# Patient Record
Sex: FEMALE | Race: OTHER | NOT HISPANIC OR LATINO | Employment: UNEMPLOYED | URBAN - METROPOLITAN AREA
[De-identification: names, ages, dates, MRNs, and addresses within clinical notes are randomized per-mention and may not be internally consistent; named-entity substitution may affect disease eponyms.]

---

## 2018-05-03 ENCOUNTER — OFFICE VISIT (OUTPATIENT)
Dept: FAMILY MEDICINE | Facility: CLINIC | Age: 68
End: 2018-05-03
Attending: FAMILY MEDICINE
Payer: COMMERCIAL

## 2018-05-03 ENCOUNTER — TELEPHONE (OUTPATIENT)
Dept: FAMILY MEDICINE | Facility: CLINIC | Age: 68
End: 2018-05-03

## 2018-05-03 VITALS
BODY MASS INDEX: 29.88 KG/M2 | OXYGEN SATURATION: 96 % | HEIGHT: 64 IN | TEMPERATURE: 99 F | DIASTOLIC BLOOD PRESSURE: 68 MMHG | WEIGHT: 175 LBS | SYSTOLIC BLOOD PRESSURE: 120 MMHG | HEART RATE: 73 BPM

## 2018-05-03 DIAGNOSIS — E78.2 MIXED HYPERLIPIDEMIA: ICD-10-CM

## 2018-05-03 DIAGNOSIS — M25.461 EFFUSION OF BOTH KNEE JOINTS: ICD-10-CM

## 2018-05-03 DIAGNOSIS — I10 ESSENTIAL HYPERTENSION: ICD-10-CM

## 2018-05-03 DIAGNOSIS — M25.562 BILATERAL CHRONIC KNEE PAIN: ICD-10-CM

## 2018-05-03 DIAGNOSIS — M17.0 PRIMARY OSTEOARTHRITIS OF BOTH KNEES: Primary | ICD-10-CM

## 2018-05-03 DIAGNOSIS — M25.561 BILATERAL CHRONIC KNEE PAIN: ICD-10-CM

## 2018-05-03 DIAGNOSIS — M25.462 EFFUSION OF BOTH KNEE JOINTS: ICD-10-CM

## 2018-05-03 DIAGNOSIS — G89.29 BILATERAL CHRONIC KNEE PAIN: ICD-10-CM

## 2018-05-03 PROCEDURE — 99999 PR PBB SHADOW E&M-NEW PATIENT-LVL IV: CPT | Mod: PBBFAC,,, | Performed by: FAMILY MEDICINE

## 2018-05-03 PROCEDURE — 3078F DIAST BP <80 MM HG: CPT | Mod: CPTII,S$GLB,, | Performed by: FAMILY MEDICINE

## 2018-05-03 PROCEDURE — 99204 OFFICE O/P NEW MOD 45 MIN: CPT | Mod: S$GLB,,, | Performed by: FAMILY MEDICINE

## 2018-05-03 PROCEDURE — 3074F SYST BP LT 130 MM HG: CPT | Mod: CPTII,S$GLB,, | Performed by: FAMILY MEDICINE

## 2018-05-03 RX ORDER — HYDROCODONE BITARTRATE AND ACETAMINOPHEN 10; 325 MG/1; MG/1
1 TABLET ORAL EVERY 12 HOURS PRN
Qty: 30 TABLET | Refills: 0 | Status: SHIPPED | OUTPATIENT
Start: 2018-05-03 | End: 2018-05-29 | Stop reason: SDUPTHER

## 2018-05-03 RX ORDER — SIMVASTATIN 20 MG/1
20 TABLET, FILM COATED ORAL NIGHTLY
COMMUNITY
End: 2018-07-10 | Stop reason: SDUPTHER

## 2018-05-03 RX ORDER — MULTIVIT WITH MINERALS/HERBS
1 TABLET ORAL DAILY
COMMUNITY

## 2018-05-03 RX ORDER — CARVEDILOL 3.12 MG/1
3.12 TABLET ORAL 2 TIMES DAILY
COMMUNITY
End: 2018-07-10 | Stop reason: SDUPTHER

## 2018-05-03 RX ORDER — ASPIRIN 81 MG/1
81 TABLET ORAL DAILY
COMMUNITY

## 2018-05-03 RX ORDER — LOSARTAN POTASSIUM 100 MG/1
100 TABLET ORAL DAILY
COMMUNITY
End: 2018-09-24 | Stop reason: SDUPTHER

## 2018-05-03 NOTE — TELEPHONE ENCOUNTER
----- Message from Millicent Gillette sent at 5/3/2018  2:35 PM CDT -----  Contact: Sujit [phone]  Sujit  ( son ) [phone] , his mother has an after hour appointment with Dr. Coleman. He said he received a call from the office and needs to know what it is in regards to. Please advise

## 2018-05-03 NOTE — PROGRESS NOTES
Subjective:       Patient ID: Rigoberto Hughes is a 67 y.o. female.    Chief Complaint: Knee Pain (anali with swelling x 2 yrs)    67 yr old pleasant [place] female with hypertension, hyperlipidemia, osteoarthritis knee, obesity and no other significant medical history presents today as new patient and establishing primary care and evaluation of severe bilateral knee pain and swelling. Onset 6 months ago but got worse since in these last 2 months. No injury or trauma. She recently moved from [place] 3 weeks ago. She had PCP over there but did not see any Orthopedics yet. The pain is constant and worse when stands on her leg and walking and movements. She was given diclofenac gel and norco which helped some but the pain comes right back after few hours. Details as follows       HTN - controlled - on coreg and losartan - compliant - no side effects       HLD - lab due - on statin - no side effect      History as below - reviewed       Knee Pain    There was no injury mechanism. The pain is present in the right knee and left knee. The quality of the pain is described as aching. The pain is at a severity of 10/10. The pain is severe. The pain has been constant since onset. Pertinent negatives include no numbness. The symptoms are aggravated by movement, palpation and weight bearing. She has tried heat, ice, elevation, immobilization, NSAIDs and acetaminophen for the symptoms. The treatment provided no relief.   Edema   This is a new problem. The current episode started 1 to 4 weeks ago. The problem occurs constantly. The problem has been unchanged. Associated symptoms include arthralgias, joint swelling and myalgias. Pertinent negatives include no abdominal pain, change in bowel habit, chest pain, chills, congestion, diaphoresis, fever, headaches, nausea, numbness, rash, sore throat, swollen glands, visual change or weakness. The symptoms are aggravated by walking and twisting. She has tried NSAIDs, sleep, rest, oral  narcotics and heat for the symptoms. The treatment provided mild relief.     Review of Systems   Constitutional: Negative.  Negative for activity change, chills, diaphoresis, fever and unexpected weight change.   HENT: Negative.  Negative for congestion, ear discharge, hearing loss, rhinorrhea, sore throat and voice change.    Eyes: Negative.  Negative for pain, discharge and visual disturbance.   Respiratory: Negative.  Negative for chest tightness, shortness of breath and wheezing.    Cardiovascular: Negative.  Negative for chest pain.   Gastrointestinal: Negative.  Negative for abdominal distention, abdominal pain, anal bleeding, change in bowel habit, constipation and nausea.   Endocrine: Negative.  Negative for cold intolerance, polydipsia and polyuria.   Genitourinary: Negative.  Negative for decreased urine volume, difficulty urinating, dysuria, frequency, menstrual problem and vaginal pain.   Musculoskeletal: Positive for arthralgias, joint swelling and myalgias. Negative for gait problem.        Knee pain and swelling B/L   Skin: Negative.  Negative for color change, pallor, rash and wound.   Allergic/Immunologic: Negative.  Negative for environmental allergies and immunocompromised state.   Neurological: Negative.  Negative for dizziness, tremors, seizures, speech difficulty, weakness, numbness and headaches.   Hematological: Negative.  Negative for adenopathy. Does not bruise/bleed easily.   Psychiatric/Behavioral: Negative.  Negative for agitation, confusion, decreased concentration, hallucinations, self-injury and suicidal ideas. The patient is not nervous/anxious.          Active Ambulatory Problems     Diagnosis Date Noted    Mixed hyperlipidemia 05/03/2018    Essential hypertension 05/03/2018    BMI 30.0-30.9,adult 05/03/2018    Effusion of both knee joints 05/03/2018     Resolved Ambulatory Problems     Diagnosis Date Noted    No Resolved Ambulatory Problems     Past Medical History:    Diagnosis Date    Arthritis     Hyperlipidemia     Hypertension      Past Surgical History:   Procedure Laterality Date     SECTION      HERNIA REPAIR       Family History   Problem Relation Age of Onset    Heart disease Mother     Diabetes Father     Heart disease Father      Social History     Social History    Marital status: Single     Spouse name: N/A    Number of children: N/A    Years of education: N/A     Occupational History    Not on file.     Social History Main Topics    Smoking status: Never Smoker    Smokeless tobacco: Never Used    Alcohol use No    Drug use: No    Sexual activity: No     Other Topics Concern    Not on file     Social History Narrative    No narrative on file     Review of patient's allergies indicates:  No Known Allergies  No current outpatient prescriptions on file prior to visit.     No current facility-administered medications on file prior to visit.        Objective:       Vitals:    18 1650   BP: 120/68   Pulse: 73   Temp: 99 °F (37.2 °C)       Physical Exam   Constitutional: She is oriented to person, place, and time. She appears well-developed and well-nourished.   HENT:   Head: Normocephalic and atraumatic.   Neck: Normal range of motion. Neck supple.   Cardiovascular: Normal rate, regular rhythm, normal heart sounds and intact distal pulses.  Exam reveals no gallop and no friction rub.    No murmur heard.  Pulmonary/Chest: Effort normal and breath sounds normal. No respiratory distress. She has no wheezes. She has no rales. She exhibits no tenderness.   Musculoskeletal: She exhibits edema and tenderness.        Right knee: She exhibits decreased range of motion, swelling, effusion and bony tenderness. Tenderness found. Medial joint line and lateral joint line tenderness noted.        Left knee: She exhibits decreased range of motion, swelling, effusion, erythema and bony tenderness. Tenderness found. Medial joint line and lateral joint line  tenderness noted.        Legs:  Neurological: She is alert and oriented to person, place, and time.   Skin: Skin is warm and dry. Capillary refill takes less than 2 seconds.   Psychiatric: She has a normal mood and affect.       Assessment:       1. Primary osteoarthritis of both knees    2. Bilateral chronic knee pain    3. Mixed hyperlipidemia    4. Essential hypertension    5. BMI 30.0-30.9,adult    6. Effusion of both knee joints        Plan:         Rigoberto was seen today for knee pain.    Diagnoses and all orders for this visit:    Primary osteoarthritis of both knees  -     hydrocodone-acetaminophen 10-325mg (NORCO)  mg Tab; Take 1 tablet by mouth every 12 (twelve) hours as needed for Pain.  -     X-Ray Knee Complete 4 Or More Views Bilat; Future  -     Ambulatory referral to Orthopedics    Bilateral chronic knee pain  -     X-Ray Knee Complete 4 Or More Views Bilat; Future  -     Ambulatory referral to Orthopedics    Mixed hyperlipidemia    Essential hypertension    BMI 30.0-30.9,adult    Effusion of both knee joints      Primary osteoarthritis knee B/L/pain and effusion  -ice application, leg elevation  -aspercreme local application  -norco prn pain  -refer Ortho for drainage, Euflexa injections and or knee replacement  - x rays Knee B/L  -ERR precautions given      HTN  -controlled    HLD  -lab due    Obesity  -diet/exercise and weight loss    Spent adequate time in obtaining history and explaining differentials    50 minutes spent during this visit of which greater than 50% devoted to face-face counseling and coordination of care regarding diagnosis and management plan    Follow-up in about 4 weeks (around 5/31/2018), or if symptoms worsen or fail to improve.

## 2018-05-03 NOTE — PATIENT INSTRUCTIONS
Understanding Osteoarthritis of the Knee    A joint is a place where two bones meet. The knee is called a hinge joint. This joint is formed where the thighbone (femur) meets the shinbone (tibia). A healthy knee joint bends freely. Knee osteoarthritis is a condition where parts of the knee joint wear out. This can lead to pain, stiffness, and limited movement.   What is osteoarthritis?  Every joint contains a smooth tissue called cartilage. Cartilage cushions the ends of bones and helps bones in a joint glide smoothly against each other. Knee osteoarthritis occurs when cartilage in the knee joint begins to break down and wear away. Bones may become exposed and rub together. The cartilage may become irritated and rough. This prevents smooth movement of the joint and can lead to pain.  Causes of osteoarthritis of the knee  Causes can include:  · Wear and tear from normal use over time  · Overuse of the knee during sports or work activities  · Being overweight. This increases stress on the knee joint.  · Misalignment of the knee joint  · Injury to the knee  Symptoms of osteoarthritis of the knee  Common symptoms include:  · Pain and swelling around the joint. The pain and swelling get worse with activity and better with rest.  · Grinding sound when moving the knee  · Reduced knee movement  · Knee stiffness. This is often worse first thing in the morning.  Treating osteoarthritis of the knee  Osteoarthritis is a long-term condition. Treatment usually focuses on managing symptoms. Treatment may include:  · Over-the-counter or prescription medicines taken by mouth to help relieve pain and swelling  · Injections of medicine into the joint to help relieve symptoms for a time  · A weight-loss plan for people who are overweight  · A plan of physical therapy and exercises to improve the strength and flexibility of the muscles around the knee  · Heat or cold therapy to help relieve pain and stiffness  · Assistive devices that  help with movement, such as a cane or a walker  · Assistive devices that make activities of daily life easier, such as raised toilet seats or shower bars  If other treatments dont do enough to relieve symptoms, you may need surgery to replace the joint. During this surgery, the damaged joint is removed. An artificial knee joint is then put into place. This can help relieve pain and stiffness and restore movement of the knee.     When to call your healthcare provider  Call your healthcare provider right away if you have any of these:  · Fever of 100.4°F (38°C) or higher, or as directed  · Symptoms that dont get better with prescribed medicines or get worse  · New symptoms   Date Last Reviewed: 3/10/2016  © 6872-6911 [employer]. [address] [place] [address], [place] [address]. All rights reserved. This information is not intended as a substitute for professional medical care. Always follow your healthcare professional's instructions.        Osteoarthritis  Osteoarthritis (also called degenerative joint disease) happens when the cartilage in a joint becomes damaged and worn. This may be due to age, wear and tear, overuse of the joint, or other problems. Osteoarthritis can affect any joint. But it is most common in hands, knees, spine, hips, and feet. Symptoms include joint stiffness, pain, and swelling.  Home care  · When a joint is more sore than usual, rest it for a day or two.  · Heat can help relieve stiffness. Take a hot bath or apply a heating pad for up to 30 minutes at a time. If symptoms are worse in the morning, using heat just after awakening can help relax the muscle and soothe the joints.   · Ice helps relieve pain and swelling. It is often used after activity. Use a cold pack wrapped in a thin cloth on the joint for 10 to 15 minutes at a time.   · Alternating hot and cold can also help relieve pain. Try this for 20 minutes at a time, several times per day.  · Exercise helps prevent the muscles  and ligaments around the joint from becoming weak. It also helps maintain function in the joint.  Be as active as you can. Talk to your healthcare provider about what activity program is best for you.  · Excess weight puts a lot of extra strain on weight-bearing joints of the lower back, hips, knees, feet and ankles. If you are overweight, talk to your healthcare provider about a safe and effective weight loss program.  · Use anti-inflammatory medicines as prescribed for pain. This includes acetaminophen or NSAIDs such as ibuprofen or naproxen. If needed, topical or injected medicines may be recommended. Talk to your healthcare provider if these options are not enough to manage your pain.  · Talk with your healthcare provider about devices that might help improve your function and reduce pain.  Follow-up care  Follow up with your healthcare provider as advised by our staff.  When to seek medical advice  Call your healthcare provider right away if any of these occur:  · Redness or swelling of a painful joint  · Discharge or pus from a painful joint  · Fever of 100.4ºF (38ºC) or higher, or as directed by your healthcare provider  · Worsening joint pain  · Decreased ability to move the joint or bear weight on the joint  Date Last Reviewed: 3/1/2017  © 3637-0578 [employer]. [address] [place] [address]. All rights reserved. This information is not intended as a substitute for professional medical care. Always follow your healthcare professional's instructions.        Knee Pain  Knee pain is very common. Its especially common in active people who put a lot of pressure on their knees, like runners. It affects women more often than men.  Your kneecap (patella) is a thick, round bone. It covers and protects the front portion of your knee joint. It moves along a groove in your thighbone (femur) as part of the patellofemoral joint. A layer of cartilage surrounds the underside of your kneecap. This  layer protects it from grinding against your femur.  When this cartilage softens and breaks down, it can cause knee pain. This is partly because of repetitive stress. The stress irritates the lining of the joint. This causes pain in the underlying bone.  What causes knee pain?  Many things can cause knee pain. You may have more than one cause. Some of these include:  · Overuse of the knee joint  · The kneecap doesnt line up with the tissue around it  · Damage to small nerves in the area  · Damage to the ligament-like structure that holds the kneecap in place (retinaculum)  · Breakdown of the bone under the cartilage  · Swelling in the soft tissues around the kneecap  · Injury  You might be more likely to have knee pain if you:  · Exercise a lot  · Recently increased the intensity of your workouts  · Have a body mass index (BMI) greater than 25  · Have poor alignment of your kneecap  · Walk with your feet turned overly outward or inward  · Have weakness in surrounding muscle groups (inner quad or hip adductor muscles)  · Have too much tightness in surrounding muscle groups (hamstrings or iliotibial band)  · Have a recent history of injury to the area  · Are female  Symptoms of knee pain  This type of knee pain is a dull, aching pain in the front of the knee in the area under and around the kneecap. This pain may start quickly or slowly. Your pain might be worse when you squat, run, or sit for a long time. You might also sometimes feel like your knee is giving out. You may have symptoms in one or both of your knees.  Diagnosing knee pain  Your healthcare provider will ask about your medical history and your symptoms. Be sure to describe any activities that make your knee pain worse. He or she will look at your knee. This will include tests of your range of motion, strength, and areas of pain of your knee. Your knee alignment will be checked.  Your healthcare provider will need to rule out other causes of your knee  pain, such as arthritis. You may need an imaging test, such as an X-ray or MRI.  Treatment for knee pain  Treatments that can help ease your symptoms may include:  · Avoiding activities for a while that make your pain worse, returning to activity over time  · Icing the outside of your knee when it causes you pain  · Taking over-the-counter pain medicine  · Wearing a knee brace or taping your knee to support it  · Wearing special shoe inserts to help keep your feet in the proper alignment  · Doing special exercises to stretch and strengthen the muscles around your hip and your knee  These steps help most people manage knee pain. But some cases of knee pain need to be treated with surgery. You may need surgery right away. Or you may need it later if other treatments dont work. Your healthcare provider may refer you to an orthopedic surgeon. He or she will talk with you about your choices.  Preventing knee pain  Losing weight and correcting excess muscle tightness or muscle weakness may help lower your risk.  In some cases, you can prevent knee pain. To help prevent a flare-up of knee pain, you do these things:  · Regularly do all the exercises your doctor or physical therapist advises  · Support your knee as advised by your doctor or physical therapist  · Increase training gradually, and ease up on training when needed  · Have an expert check your gait for running or other sporting activities  · Stretch properly before and after exercise  · Replace your running shoes regularly  · Lose excess weight     When to call your healthcare provider  Call your healthcare provider right away if:  · Your symptoms dont get better after a few weeks of treatment  · You have any new symptoms   Date Last Reviewed: 4/1/2017  © 0509-2394 [employer]. [address] [place] [address], [place] [address]. All rights reserved. This information is not intended as a substitute for professional medical care. Always follow your healthcare  professional's instructions.        Osteoarthritis: Coping with Pain    There are many ways to control your pain. Youre making a good start by learning about osteoarthritis and its treatments. Knowing more about this condition helps you work with your healthcare provider to find answers to problems. Keeping a positive outlook can help you manage pain from day to day. And making time each day to relax and enjoy yourself may help you control osteoarthritis pain, instead of letting it control you. Try these methods to help you cope with, and even reduce, your pain.  Take control  Relaxing may help relieve muscle aches that result from joint pain. To relax, try these techniques:  · Breathe slowly and calmly and think of a peaceful scene.  · Meditate by focusing your mind on one word, object, or idea.  Getting plenty of sleep can help reduce pain and let you function better. If pain is making it hard for you to sleep, ask your doctor about ways to control pain and ensure a good nights sleep. Cutting back on caffeine and alcohol can help you sleep better. So can going to bed and getting up at about the same time every day.  Use distraction  Getting your mind off the pain may seem hard to do. But it can actually help reduce pain. When you are in pain, try one of these ways of distracting yourself:  · Watch a funny movie with a friend.  · Listen to music you enjoy.  · Read a novel.  · Talk with friends or family.  · Go to a museum, park, or other favorite attraction.  · Arrange to do a regular activity, such as volunteer work.  Heat and cold  Using heat and cold treatments are simple ways to lessen arthritis symptoms:  · Heat soothes stiff joints and tired muscles. Heat works well before exercise, for example. Heat treatments include:  ¨ A warm shower or baths, or soak (for example, fill the sink with warm water and move your fingers, hands, and wrists around in the water)  ¨ A moist heating pad  ¨ A warm, moist wash  cloth  ¨ An electric blanket or throw  · Cold treatments help to numb painful areas and decrease swelling. Cold treatments include the following wrapped in a thin towel:  ¨ An ice pack or bag of ice  ¨ A gel-filled cold pack  ¨ A bag of frozen vegetables, like peas or corn  Be careful when using heat or cold. You can injure your skin. Each treatment should only last for 10 to 20 minutes. Your healthcare provider or therapist can give you specific instructions.  Acupuncture  Acupuncture is a 2000-year-old practice. Practitioners insert thin needles in specific parts of the body. Research shows that it can help to relieve the pain of arthritis.  For more information or to find a practitioner in your area, contact the American Academy of Medical Acupuncture. Its website is: http://www.medicalacupuncture.org/.  Massage  Therapeutic massage has many benefits. It may:  · Help you and your muscles relax  · Improve blood flow to muscles and joints  · Help joints stay more flexible  Look for a certified massage therapist. Many are trained to treat sore muscles and joint pain and stiffness.  Vitamins, supplements, and herbs  People with arthritis, or other long-term conditions that cause pain, often look for alternative ways to lessen pain. Vitamins, supplements, and herbs may or may not help you to feel better. Before you try any vitamin, supplement, or herb, make sure you ask your healthcare provider or pharmacist.  Physical therapy/occupational therapy  Evaluation by a physical therapist and or occupational therapist for assessment for limitations in activities of daily living  Assistance with developing an appropriate exercise routine for both muscle strengthening and cardiovascular health  Weight management  Studies have demonstrated that weight loss in overweight individuals can improve osteoarthritis symptoms.  Talk with your healthcare provider regarding your optimal weight and techniques for weight management if  necessary.  Psychological treatments  Research shows that many psychological therapies or those that deal with thinking and emotions, help people cope with arthritis pain. Therapies include cognitive-behavioral therapy (CBT), pain coping skills training, biofeedback, stress management, and hypnosis. Ask your healthcare provider for more information about these therapies.  For more information about many of these methods, contact the National Center for Complementary and Alternative Medicine (NCCAM) at http://www.[facility].gov.   Date Last Reviewed: 2/14/2016 © 2000-2017 [employer]. [address] [place] [address]. All rights reserved. This information is not intended as a substitute for professional medical care. Always follow your healthcare professional's instructions.        Osteoarthritis: Injections and Surgery     Talk with your healthcare provider about your treatment options.     Injections or surgery may help if you have pain or movement problems that severely limit your activities. Your healthcare provider can tell you more about these treatment choices and their risks and complications.  Injections  Medicine can be injected directly into the affected joint. These shots take a few minutes and are done in your healthcare providers office:  · Corticosteroid or steroid injections may ease swelling and pain. The medicine is injected into the joint--for example, the knee or hip. Steroid injections do have risks, so healthcare providers limit the number of injections used in any one joint.   · Lubricant supplementation injections use hyaluronic acid, a substance similar to one found naturally in the joint. It may help the joint work more smoothly. These injections are only for osteoarthritis in the knees.  Surgery  Choices for surgery include:  · Arthroscopy. The surgeon looks at and works inside the joint using special instruments put through very small incisions. The cartilage is  smoothed. Any pieces of cartilage that have broken off are removed.  · Total joint replacement. The entire joint is taken out and replaced with a manmade joint using metal, ceramic, and/or plastic. This is most often done with the knee or hip joint.  · Other surgery. There are other surgical procedures specific to certain joints. For example, joint resurfacing may be done on the hip joint.  Date Last Reviewed: 2/14/2016 © 2000-2017 [employer]. [address] [place] [address]. All rights reserved. This information is not intended as a substitute for professional medical care. Always follow your healthcare professional's instructions.        Osteoarthritis: Natural and Alternative Treatments     Therapeutic massage is one alternative treatment option.   The treatment for osteoarthritis includes lifestyle changes like weight loss and exercise. Medicines and surgery may also be part of the treatment. There are also many natural and alternative treatments. These treatments may also help relieve pain and stiffness caused by osteoarthritis.  Heat and cold  Using heat and cold treatments are simple ways to lessen arthritis symptoms:  · Heat soothes stiff joints and tired muscles. Heat works well before exercise, for example. Heat treatments include:  ¨ A warm shower or baths, or soak (for example, fill the sink with warm water and move your fingers, hands, and wrists around in the water)  ¨ A moist heating pad  ¨ A warm, moist wash cloth  ¨ An electric blanket or throw  · Cold treatments help to numb painful areas and decrease swelling. Cold treatments include the following wrapped in a thin towel:  ¨ An ice pack or bag of ice  ¨ A gel-filled cold pack  ¨ A bag of frozen vegetables, like peas or corn  Be careful when using heat or cold. You can injure your skin. Each treatment should only last for 10 to 20 minutes. Your healthcare provider or therapist can give you specific  instructions.     Meditation and relaxation  Meditation and relaxation can help you deal with arthritis pain. There are many different methods available including deep breathing exercises, meditation, and yoga. Look for information and programs on the Internet or in your community. Or try this simple deep breathing technique sometimes called belly breathin. Sit in a comfortable chair or lie on your back.   2. Put one hand on your chest and the other hand on your stomach.  3. Take a breath in through your nose. The hand on your stomach should rise. The hand on your chest should move very little.  4. Breathe out through your mouth, pushing out as much air as you can. The hand on your stomach should move in as you breathe out, but the hand on your chest should move very little. You should feel the muscles of your stomach tighten.   5. Continue to breathe in through your nose and out through your mouth. You should feel your stomach rise and fall. Count slowly each time you breathe out.  Acupuncture  Acupuncture is a -year-old practice. Practitioners insert thin needles in specific parts of the body. Research shows that it can help to relieve the pain of arthritis.   For more information or to find a practitioner in your area, contact the American Academy of Medical Acupuncture. Its website is: http://www.medicalacupuncture.org/.  Massage  Therapeutic massage has many benefits. It may:  · Help you and your muscles relax  · Improve blood flow to muscles and joints  · Help joints stay more flexible.  Look for a certified massage therapist. Many are trained to treat sore muscles and joint pain and stiffness.  Vitamins, supplements, and herbs  People with arthritis, or other long-term conditions that cause pain, often look for alternative ways to lessen pain. Vitamins, supplements, and herbs may or may not help you to feel better. Before you try any vitamin, supplement, or herb, make sure you ask your healthcare  provider or pharmacist.  Physical therapy/occupational therapy  · Evaluation by a physical therapist and or occupational therapist for assessment for limitations in activities of daily living  · Assistance with developing an appropriate exercise routine for both muscle strengthening and cardiovascular health  Weight management  · Studies have demonstrated that weight loss in overweight individuals can improve osteoarthritis symptoms  · Talk with your healthcare provider regarding your optimal weight and techniques for weight management if necessary.   Psychological treatments  Research shows that many psychological therapies or those that deal with thinking and emotions, help people cope with arthritis pain. Therapies include: cognitive-behavioral therapy (CBT), pain coping skills training, biofeedback, stress management, and hypnosis. Ask your healthcare provider for more information about these therapies.  For more information about many of these methods, contact the National Center for Complementary and Alternative Medicine at http://www.nccam.nih.gov.  Date Last Reviewed: 2/14/2016 © 2000-2017 [employer]. [address] [place] [address], [place] [address]. All rights reserved. This information is not intended as a substitute for professional medical care. Always follow your healthcare professional's instructions.        Osteoarthritis: Tips for Daily Living     Lift items with both hands.   Making a few changes in your daily life can reduce stress on your joints. This helps protect the joints from further damage.  Your surroundings  Make your home work for you:  · Arrange cupboards, closets, desks, and drawers to reduce reaching and bending.  · Arrange furniture to make it safer and easier to get around.  · Secure or remove rugs, power cords, and other items that might make you slip or trip.  Think ahead  Plan in advance:  · Combine errands so that you make fewer trips up and down stairs.  · Break up  "packages so that you carry less weight with each trip. For example, ask cashiers to use more bags for your groceries.  · If you need help with chores or errands, try to arrange for it in advance.  · If you need to lift something heavy, ask for help.  · Try to use other parts of your body if you have pain in certain joints.  Use whats available  To rest your hands, back, and neck:  · Make sure that knives are sharp.  · Use a "reacher" or grasper to reach and grab.  · Use soap-on-a-rope and a long-handled scrubber in the shower or bath.  To rest your knees, hips, and lower back:  · Wear shoes that feel good, fit well, and provide good support.  · Choose chairs with firm seats and armrests.  Assistive devices  Devices are available to help you:  · In the kitchen, use light-weight dishes, cook and bakeware.  · Attach larger handles to keys.  · Use helpful gripping devices for opening jars.   · For gardening, use a rolling bench to sit on or to hold your tools. Use tools with padded handles.  · In the bathroom, try using grab bars, a raised toilet seat, or a shower seat.  · A cane, brace, or walker may help you walk more easily. Make sure that its properly fitted and that youre trained to use it.  Date Last Reviewed: 2/14/2016  © 0871-5132 [employer]. [address] [place] [address], [place] [address]. All rights reserved. This information is not intended as a substitute for professional medical care. Always follow your healthcare professional's instructions.        "

## 2018-05-03 NOTE — TELEPHONE ENCOUNTER
Spoke to pt's son and states that he was returning a call. Pt's son was transferred to [facility] regarding his mom's appt for after hours clinic.

## 2018-05-04 ENCOUNTER — HOSPITAL ENCOUNTER (OUTPATIENT)
Dept: RADIOLOGY | Facility: HOSPITAL | Age: 68
Discharge: HOME | End: 2018-05-04
Attending: FAMILY MEDICINE
Payer: COMMERCIAL

## 2018-05-04 DIAGNOSIS — Z12.39 BREAST CANCER SCREENING: ICD-10-CM

## 2018-05-04 DIAGNOSIS — M17.0 PRIMARY OSTEOARTHRITIS OF BOTH KNEES: ICD-10-CM

## 2018-05-04 DIAGNOSIS — M25.562 BILATERAL CHRONIC KNEE PAIN: ICD-10-CM

## 2018-05-04 DIAGNOSIS — M25.561 BILATERAL CHRONIC KNEE PAIN: ICD-10-CM

## 2018-05-04 DIAGNOSIS — Z12.11 COLON CANCER SCREENING: ICD-10-CM

## 2018-05-04 DIAGNOSIS — G89.29 BILATERAL CHRONIC KNEE PAIN: ICD-10-CM

## 2018-05-04 PROCEDURE — 73564 X-RAY EXAM KNEE 4 OR MORE: CPT | Mod: 50,TC,FY

## 2018-05-04 PROCEDURE — 73564 X-RAY EXAM KNEE 4 OR MORE: CPT | Mod: 26,50,, | Performed by: RADIOLOGY

## 2018-05-07 ENCOUNTER — TELEPHONE (OUTPATIENT)
Dept: FAMILY MEDICINE | Facility: CLINIC | Age: 68
End: 2018-05-07

## 2018-05-07 NOTE — TELEPHONE ENCOUNTER
----- Message from Austin Coleman MD sent at 5/4/2018 11:33 PM CDT -----  Call    X ray showed severe degenerative joint disease - follow orthopedics

## 2018-05-07 NOTE — TELEPHONE ENCOUNTER
Informed pt's son, that pt's x-ray showed severe degenerative joint disease and to keep her Orthopedics appt on 5/18.

## 2018-05-08 ENCOUNTER — TELEPHONE (OUTPATIENT)
Dept: ORTHOPEDICS | Facility: CLINIC | Age: 68
End: 2018-05-08

## 2018-05-08 NOTE — TELEPHONE ENCOUNTER
Spoke with Sujit patient's son. Offered sooner appointment. Denied appointment.  ----- Message from Sayra Solorio sent at 5/8/2018  3:38 PM CDT -----  Contact: [phone] son Sujit   Patient's son is returning your call. Please advise.

## 2018-05-18 ENCOUNTER — OFFICE VISIT (OUTPATIENT)
Dept: ORTHOPEDICS | Facility: CLINIC | Age: 68
End: 2018-05-18
Attending: FAMILY MEDICINE
Payer: COMMERCIAL

## 2018-05-18 VITALS
BODY MASS INDEX: 29.88 KG/M2 | WEIGHT: 175 LBS | HEIGHT: 64 IN | DIASTOLIC BLOOD PRESSURE: 63 MMHG | SYSTOLIC BLOOD PRESSURE: 132 MMHG

## 2018-05-18 DIAGNOSIS — M17.0 PRIMARY OSTEOARTHRITIS OF BOTH KNEES: Primary | ICD-10-CM

## 2018-05-18 PROCEDURE — 3078F DIAST BP <80 MM HG: CPT | Mod: CPTII,S$GLB,, | Performed by: ORTHOPAEDIC SURGERY

## 2018-05-18 PROCEDURE — 99999 PR PBB SHADOW E&M-EST. PATIENT-LVL IV: CPT | Mod: PBBFAC,,, | Performed by: ORTHOPAEDIC SURGERY

## 2018-05-18 PROCEDURE — 3075F SYST BP GE 130 - 139MM HG: CPT | Mod: CPTII,S$GLB,, | Performed by: ORTHOPAEDIC SURGERY

## 2018-05-18 PROCEDURE — 99204 OFFICE O/P NEW MOD 45 MIN: CPT | Mod: S$GLB,,, | Performed by: ORTHOPAEDIC SURGERY

## 2018-05-18 RX ORDER — SODIUM CHLORIDE 0.9 % (FLUSH) 0.9 %
3 SYRINGE (ML) INJECTION
Status: CANCELLED | OUTPATIENT
Start: 2018-05-18

## 2018-05-18 RX ORDER — MUPIROCIN 20 MG/G
OINTMENT TOPICAL
Status: CANCELLED | OUTPATIENT
Start: 2018-05-18

## 2018-05-18 RX ORDER — ACETAMINOPHEN 325 MG/1
1000 TABLET ORAL
Status: CANCELLED | OUTPATIENT
Start: 2018-05-18 | End: 2018-05-18

## 2018-05-18 RX ORDER — PREGABALIN 50 MG/1
150 CAPSULE ORAL
Status: CANCELLED | OUTPATIENT
Start: 2018-05-18 | End: 2018-05-19

## 2018-05-18 RX ORDER — OXYCODONE HCL 10 MG/1
10 TABLET, FILM COATED, EXTENDED RELEASE ORAL
Status: CANCELLED | OUTPATIENT
Start: 2018-05-18 | End: 2018-05-18

## 2018-05-18 RX ORDER — NAPROXEN 250 MG/1
500 TABLET ORAL
Status: CANCELLED | OUTPATIENT
Start: 2018-05-18 | End: 2018-05-18

## 2018-05-18 NOTE — LETTER
May 18, 2018      Austin Coleman MD  [address]  [address]  [facility] [address]           [facility]  [address]  [facility] [address]  Phone: [phone]          Patient: Rigoberto Hughes   MR Number: [id number]   YOB: 1950   Date of Visit: 5/18/2018       Dear Dr. Austin Coleman:    Thank you for referring Rigoberto Hughes to me for evaluation. Attached you will find relevant portions of my assessment and plan of care.    If you have questions, please do not hesitate to call me. I look forward to following Rigoberto Hughes along with you.    Sincerely,    Baljit Mead MD    Enclosure  CC:  No Recipients    If you would like to receive this communication electronically, please contact [email or web address] or [phone] to request more information on [employer] Link access.    For providers and/or their staff who would like to refer a patient to [facility], please contact us through our one-stop-shop provider referral line, [facility] , at [phone].    If you feel you have received this communication in error or would no longer like to receive these types of communications, please e-mail [email or web address]

## 2018-05-18 NOTE — PROGRESS NOTES
Subjective:      Patient ID: Rigoberto Hughes is a 67 y.o. female.    Chief Complaint: Pain of the Right Knee and Pain of the Left Knee    HPI     The patient is seen along with her son.  She complains of years of bilateral knee pain of equal severity.  No specific injury.  Over the past 6 months her symptoms have gotten worse such that she requires a wheelchair to go out and a walker at home.  Recent treatments include hydrocodone, Naprosyn and topical NSAIDs.  In the past corticosteroid injections have been mildly beneficial.  Review of Systems   Constitution: Negative for fever and weight loss.   HENT: Negative for congestion.    Eyes: Negative for visual disturbance.   Cardiovascular: Negative for chest pain.   Respiratory: Negative for shortness of breath.    Hematologic/Lymphatic: Negative for bleeding problem. Does not bruise/bleed easily.   Skin: Negative for poor wound healing.   Musculoskeletal: Positive for joint pain and joint swelling.   Gastrointestinal: Negative for abdominal pain.   Genitourinary: Negative for dysuria.   Neurological: Negative for seizures.   Psychiatric/Behavioral: Negative for altered mental status.   Allergic/Immunologic: Negative for persistent infections.         Objective:            Ortho/SPM Exam    Right Knee    Vitals:    05/18/18 1508   BP: 132/63       The patient is not in acute distress.   Body habitus is normal.   Resisted SLR negative.   The skin over the knee is intact.  Knee effusion 1+  Tendernes is located presentmedial and lateral  Range of motion- Flexion 95 deg, Extension 10 deg,   Ligament exam:   MCL 2+   Lachman intact              Post sag intact    LCL intact  Patellar apprehension negative.  Popliteal cyst positive  Patellar crepitation present.  Flexion/pinch negative.  Pulses DP present, PT present.  Motor normal 5/5 strength in all tested muscle groups.   Sensory normal.    Left Knee      Vitals:    05/18/18 1508   BP: 132/63       This side is  idententical to the contralateral side.      I reviewed the relevant radiographic images for the patient's condition:  Recent films of both knees show complete loss of joint space with tricompartmental narrowing, osteophytes and varus deformity        Assessment:       Encounter Diagnosis   Name Primary?    Primary osteoarthritis of both knees Yes          The patient has very advanced objective findings, severe impairment and no meaningful improvement with extensive nonsurgical care  Plan:       Rigobetro was seen today for pain and pain.    Diagnoses and all orders for this visit:    Primary osteoarthritis of both knees                I explained my diagnostic impression and the reasoning behind it in detail, using layman's terms.  Models and/or pictures were used to help in the explanation.    Treatment options were discussed. The surgical process of bilateral knee replacement was discussed in detail with the patient including a detailed discussion of the procedure itself (including visual model, x-ray review, and literature review). The typical perioperative and post-operative course was discussed and perioperative risks were discussed to the patient's satisfaction.  Risks and complications discussed included but were not limited to the risks of anesthetic complications, infection, bleeding, wound healing complications, stiffness, aseptic loosening, instability, limb length inequality, neurologic dysfunction including numbness and weakness, additional surgery,  DVT, pulmonary embolism, perioperative medical risks (cardiac, pulmonary, renal, neurologic), and death and the patient elects to proceed.  The patient should get medically cleared and attend the joint seminar.    The patient's son was present for the entire process, and he also understood and agreed with the plan

## 2018-05-29 ENCOUNTER — TELEPHONE (OUTPATIENT)
Dept: FAMILY MEDICINE | Facility: CLINIC | Age: 68
End: 2018-05-29

## 2018-05-29 DIAGNOSIS — M17.0 PRIMARY OSTEOARTHRITIS OF BOTH KNEES: ICD-10-CM

## 2018-05-29 RX ORDER — HYDROCODONE BITARTRATE AND ACETAMINOPHEN 10; 325 MG/1; MG/1
1 TABLET ORAL EVERY 12 HOURS PRN
Qty: 30 TABLET | Refills: 0 | Status: ON HOLD | OUTPATIENT
Start: 2018-05-29 | End: 2018-07-15

## 2018-05-29 NOTE — TELEPHONE ENCOUNTER
Attempted to call patient to notify that prescription is ready for pickup.  No answer, left voicemail requesting a call back.

## 2018-06-08 DIAGNOSIS — Z11.59 NEED FOR HEPATITIS C SCREENING TEST: ICD-10-CM

## 2018-06-28 ENCOUNTER — CLINICAL SUPPORT (OUTPATIENT)
Dept: LAB | Facility: HOSPITAL | Age: 68
End: 2018-06-28
Attending: ORTHOPAEDIC SURGERY
Payer: COMMERCIAL

## 2018-06-28 ENCOUNTER — ANESTHESIA EVENT (OUTPATIENT)
Dept: SURGERY | Facility: HOSPITAL | Age: 68
DRG: 462 | End: 2018-06-28
Payer: COMMERCIAL

## 2018-06-28 ENCOUNTER — HOSPITAL ENCOUNTER (OUTPATIENT)
Dept: PREADMISSION TESTING | Facility: HOSPITAL | Age: 68
Discharge: HOME | End: 2018-06-28
Attending: ORTHOPAEDIC SURGERY
Payer: COMMERCIAL

## 2018-06-28 VITALS
WEIGHT: 151.38 LBS | HEART RATE: 64 BPM | SYSTOLIC BLOOD PRESSURE: 122 MMHG | RESPIRATION RATE: 18 BRPM | HEIGHT: 63 IN | TEMPERATURE: 98 F | OXYGEN SATURATION: 96 % | BODY MASS INDEX: 26.82 KG/M2 | DIASTOLIC BLOOD PRESSURE: 64 MMHG

## 2018-06-28 DIAGNOSIS — Z01.818 PRE-OP EXAM: ICD-10-CM

## 2018-06-28 DIAGNOSIS — Z01.818 PRE-OP EXAM: Primary | ICD-10-CM

## 2018-06-28 PROCEDURE — 93005 ELECTROCARDIOGRAM TRACING: CPT

## 2018-06-28 RX ORDER — LIDOCAINE HYDROCHLORIDE 10 MG/ML
1 INJECTION, SOLUTION EPIDURAL; INFILTRATION; INTRACAUDAL; PERINEURAL ONCE
Status: CANCELLED | OUTPATIENT
Start: 2018-06-28 | End: 2018-06-28

## 2018-06-28 RX ORDER — SODIUM CHLORIDE, SODIUM LACTATE, POTASSIUM CHLORIDE, CALCIUM CHLORIDE 600; 310; 30; 20 MG/100ML; MG/100ML; MG/100ML; MG/100ML
INJECTION, SOLUTION INTRAVENOUS CONTINUOUS
Status: CANCELLED | OUTPATIENT
Start: 2018-06-28

## 2018-06-28 NOTE — DISCHARGE INSTRUCTIONS
· Arrive on 7/11/2018  at  06:00 a.m .  · Report to the 2nd floor Procedural Check In Room .   · Nothing to eat or drink after midnight the night before your procedure.  · Take the CARVEDILOL (COREG) medications the morning of surgery with a small sip of water.                                                         · Please remove all body piercings and leave all your jewelery and valuables at home .  · Please remove your contact lenses.   · Wear loose comfortable clothing.  · You will not be able to drive that day, please make arrangement for transportation to and from your procedure.  · You cannot be alone for 24 hours after anesthesia. Make arrangements as needed.  · Shower the night before your procedure and the morning of your procedure with Hibiclens antibacterial solution.  · No lotions, creams or powders  · Stop Aspirin, Ibuprofen, Advil, Motrin, Aleve, Nuprin, Naprosyn (all NSAID Medication) or any other blood thinners 5 days before your procedure unless directed by your physician.  Also hold all over the counter vitamins and herbal supplements for 5 days prior to your procedure.  · You may take Tylenol or Acetaminophen up the day of surgery for any pain.        Call [phone] with any questions.          Pre-Op Bathing Instructions    Before surgery, you can play an important role in your own health.    Because skin is not sterile, we need to be sure that your skin is as free of germs as possible. By following the instructions below, you can reduce the number of germs on your skin before surgery.    IMPORTANT: You will need to shower with a special soap called Hibiclens*, available at any pharmacy, over the counter usually in the first aid isle.  If you are allergic to Chlorhexidine (the antiseptic in Hibiclens), use an antibacterial soap such as Dial Soap for your preoperative showers.  You will shower with Hibiclens the night before and the morning of surgery. Both the night before your surgery and  the morning of your surgery see steps 2 and 3.   Do not use Hibiclens on the head, face or genitals to avoid injury to those areas.    STEP #1  1.  Shower with Hibiclens solution night before and the morning of surgery.      STEP #2: THE NIGHT BEFORE YOUR SURGERY     1. Do not shave the area of your body where your surgery will be performed.  2. Wash your hair as usual with your normal  Shampoo. Wash body shoulder to toes with your normal soap.  3. Squeeze Hibiclens into hand apply to surgical site.   4. Wash the site gently for five (5) minutes. Do not scrub your skin too hard.   5. Do not wash with your regular soap after Hibiclens is used.  6. Rinse your body thoroughly.  7. Pat yourself dry with a clean, soft towel.  8. Do not use lotion, cream, or powder.  9. Wear clean clothes.    STEP #3: THE MORNING OF YOUR SURGERY     1. Repeat Step #2.    * Not to be used by people allergic to Chlorhexidine.              Anesthesia: Regional Anesthesia    Youre scheduled for surgery. During surgery, youll receive medicine called anesthesia to keep you comfortable and pain-free. Your surgeon has decided that youll receive regional anesthesia. This sheet tells you what to expect with this type of anesthesia.  What is regional anesthesia?  Regional anesthesia numbs one region of your body. The anesthesia may be given around nerves or into veins in your arms, neck, or legs (nerve block or [place] block). Or it may be sent into the spinal fluid (spinal anesthesia) or into the space just outside the spinal fluid (epidural anesthesia). You may also be given sedatives to help you relax.  Nerve block or [place] block  A small area of the body, such as an arm or leg, can be numbed using a nerve block or [place] block.  · Nerve block. During a nerve block, your skin is numbed. A needle is then inserted near nerves that serve the area to be numbed. Anesthetic is sent through the needle.  · IV regional or [place] block. For this type of block,  an IV line is put into a vein. The blood flow to the area to be numbed is blocked for a short time. Anesthetic is sent through the IV.  Spinal anesthesia  Spinal anesthesia numbs your body from about the waist down.  · Anesthetic is injected into the spinal fluid. This is a substance that surrounds the spinal cord in your spinal column. The anesthetic blocks pain traveling from the body to the brain.  · To receive the anesthetic, your skin is numbed at the injection site on your back.  · A needle is then inserted into the spinal space. Anesthetic is sent into the spinal fluid through the needle.  Epidural anesthesia  Epidural anesthesia is most commonly used during childbirth and may also be used after surgical procedures of the chest, belly, and legs.  · Anesthetic is injected into the epidural space. This is just outside the dural sac which contains the spinal fluid.  · To receive the anesthetic, your skin is numbed at the injection site on your back.  · A needle is then inserted into the epidural space. Anesthetic is sent into the epidural space through the needle.  · A small flexible catheter may be attached to the needle and left in place. This allows for continuous injections or infusions of anesthetic.  Anesthesia tools and medicines that might be near you during your procedure  · Local anesthetic. This medicine is given through a needle numbs one region of your body.  · Electrocardiography leads (electrodes). These are used to record your heart rate and rhythm.  · Blood pressure cuff. A cuff is placed on your arm to keep track of your blood pressure.  · Pulse oximeter. This small clip is placed on the end of the finger. It measures your blood oxygen level.  · Sedatives. These medicines may be given through an IV. They help to relax you and keep you comfortable. You may stay awake or sleep lightly.  · Oxygen. You may be given oxygen through a facemask.  Risks and possible complications  Regional anesthesia  carries some risks. These include:  · Nausea and vomiting  · Headache  · Backache  · Decreased blood pressure  · Allergic reaction to the anesthetic  · Ongoing numbness (rare)  · Irregular heartbeat (rare)  · Cardiac arrest (rare)   Date Last Reviewed: 12/1/2016 © 2000-2017 [employer]. [address] [place] [address]. All rights reserved. This information is not intended as a substitute for professional medical care. Always follow your healthcare professional's instructions.          Understanding Peripheral Nerve Blocks (PNBs)  Regional anesthesia is medicine that numbs a section of your body. Peripheral nerve blocks (PNBs) are a type of regional anesthesia. To do the block, a healthcare provider injects numbing medicine into a certain nerve or bundle of nerves. The area below the nerves is then numbed for a time.  Why PNBs are done  PNBs are most often used to prevent pain during surgery and for a time afterward. They can be used for your arms, hands, legs, or feet. They may also be used for your neck, face, or groin. PNBs provide pain relief that lasts longer than local anesthesia. They can also be used to numb smaller areas of the body than other types of regional anesthesia.  How PNBs are done  · An IV (intravenous) line may be put into a vein in your arm or hand. This line provides fluids and medicines.  · Medical staff closely watches your blood pressure, breathing, and heart rate during the procedure.  · You may be given medicine to help you relax and make you sleepy.  · The healthcare provider identifies the nerves to be numbed. He or she may do this with the help of ultrasound or nerve stimulation. The injection site is chosen.  · The provider inserts a needle with the medicine (anesthetic) at the injection site. He or she injects the medicine.  · The targeted part of your body becomes numb within 10 to 30 minutes. The area stays numb throughout the procedure.  After the procedure  is done, the numbness slowly wears off over the next 6 to 30 hours, depending on the type of medicine used.  Risks of PNBs  · Bruising at the injection site  · Nerve injury  · Reaction to the anesthetic  · Injury to the numbed area of the body  Date Last Reviewed: 6/1/2016  © 8169-3005 [employer]. [address] [place] [address]. All rights reserved. This information is not intended as a substitute for professional medical care. Always follow your healthcare professional's instructions.        Anesthesia: Monitored Anesthesia Care (MAC)    Youre due to have surgery. During surgery, youll be given medicine called anesthesia. This will keep you comfortable and pain-free. Your surgeon will use monitored anesthesia care (MAC). This sheet tells you more about this type of anesthesia.  What is monitored anesthesia care?  MAC keeps you very drowsy during surgery. You may be awake, but you will likely not remember much. And you wont feel pain. With MAC, medicines are given through an IV line into a vein in your arm or hand. A local anesthetic will usually be injected into the skin and muscle around the surgical site to numb it. The anesthesia provider monitors you during the procedure. He or she checks your heart rate and rhythm, blood pressure, and blood oxygen level.  Anesthesia tools and medicines that may be near you during your procedure  You will likely have:  · A pulse oximeter on the end of your finger. This measures your blood oxygen level.  · Electrocardiography leads (electrodes) on your chest. These record your heart rate and rhythm.  · Medicines given through an IV. These relax you and prevent pain. You may be awake or sleep lightly. If you have local anesthetic, it is injected directly into your skin.  · A facemask to give you oxygen, if needed.  Risks and possible complications  MAC has some risks. These include:  · Breathing problems  · Nausea and vomiting  · Allergic reaction to  the anesthetic    Anesthesia safety  Tips for anesthesia safety include the following:   · Follow all instructions you are given for how long not to eat or drink before your procedure.  · Be sure your healthcare provider knows what medicines you take, especially any anti-inflammatory medicine or blood thinners. This includes aspirin and any other over-the-counter medicines, herbs, and supplements.  · Have an adult family member or friend drive you home after the procedure.  · For the first 24 hours after your surgery:  ¨ Do not drive or use heavy equipment.  ¨ Do not make important decisions or sign documents.  ¨ Avoid alcohol.  ¨ Have someone stay with you, if possible. They can watch for problems and help keep you safe.  Date Last Reviewed: 12/1/2016  © 5167-1327 [employer]. [address] [place] [address], [place] [address]. All rights reserved. This information is not intended as a substitute for professional medical care. Always follow your healthcare professional's instructions.

## 2018-06-28 NOTE — ANESTHESIA PREPROCEDURE EVALUATION
2018  Rigoberto Hughes is a 67 y.o., female is scheduled for bilateral TKA under spinal/reg/MAC/gen on 2018.    Pt speaks Telugu and  available during visit.    PCP H&P in [facility] on 2018.      Past Surgical History:   Procedure Laterality Date     SECTION      HERNIA REPAIR       BP Readings from Last 3 Encounters:   18 122/64   18 132/63   18 120/68       Anesthesia Evaluation    I have reviewed the Patient Summary Reports.    I have reviewed the Nursing Notes.   I have reviewed the Medications.     Review of Systems  Anesthesia Hx:  No problems with previous Anesthesia  History of prior surgery of interest to airway management or planning: Previous anesthesia: General  Denies Personal Hx of Anesthesia complications.   Social:  Non-Smoker, No Alcohol Use    Hematology/Oncology:  Hematology Normal      Hematology Comments: On ASA for primary prevention and will hold starting 2018. Pt will resume post op per primary team    EENT/Dental:EENT/Dental Normal   Cardiovascular:   Exercise tolerance: good Hypertension, well controlled Denies Dysrhythmias.   Denies Angina. hyperlipidemia        Pulmonary:   Denies Shortness of breath.    Renal/:  Renal/ Normal     Hepatic/GI:  Hepatic/GI Normal    Musculoskeletal:   Arthritis  Bilateral knee pain   Neurological:  Neurology Normal    Endocrine:  Endocrine Normal        Physical Exam  General:  Well nourished    Airway/Jaw/Neck:  Airway Findings: Mouth Opening: Normal Tongue: Normal  General Airway Assessment: Adult  Mallampati: II  TM Distance: Normal, at least 6 cm        Eyes/Ears/Nose:  EYES/EARS/NOSE FINDINGS: Normal   Dental:  Dental Findings: Upper Dentures   Chest/Lungs:  Chest/Lungs Clear    Heart/Vascular:  Heart Findings: Normal Heart murmur: negative    Abdomen:  Abdomen Findings: Normal     Musculoskeletal:  Musculoskeletal Findings: (bilateral knees) Tender Joint, Swollen Joint     Mental Status:  Mental Status Findings: Normal        Anesthesia Plan  Type of Anesthesia, risks & benefits discussed:  Anesthesia Type:  general, MAC, regional, spinal  Patient's Preference:   Intra-op Monitoring Plan:   Intra-op Monitoring Plan Comments:   Post Op Pain Control Plan:   Post Op Pain Control Plan Comments:   Induction:   IV  Beta Blocker:         Informed Consent: Patient understands risks and agrees with Anesthesia plan.  Questions answered.   ASA Score: 2     Day of Surgery Review of History & Physical:        Anesthesia Plan Notes: Anesthesia consent will be obtained prior to procedure on 7/11/2018.    PCP H&P in [facility] on 5/03/2018.        Ready For Surgery From Anesthesia Perspective.

## 2018-07-05 ENCOUNTER — OFFICE VISIT (OUTPATIENT)
Dept: ORTHOPEDICS | Facility: CLINIC | Age: 68
End: 2018-07-05
Payer: COMMERCIAL

## 2018-07-05 VITALS
DIASTOLIC BLOOD PRESSURE: 73 MMHG | TEMPERATURE: 98 F | HEIGHT: 63 IN | BODY MASS INDEX: 26.8 KG/M2 | HEART RATE: 68 BPM | SYSTOLIC BLOOD PRESSURE: 123 MMHG | WEIGHT: 151.25 LBS

## 2018-07-05 DIAGNOSIS — M17.0 PRIMARY OSTEOARTHRITIS OF BOTH KNEES: Primary | ICD-10-CM

## 2018-07-05 PROCEDURE — 99499 UNLISTED E&M SERVICE: CPT | Mod: S$GLB,,, | Performed by: ORTHOPAEDIC SURGERY

## 2018-07-05 PROCEDURE — 99999 PR PBB SHADOW E&M-EST. PATIENT-LVL III: CPT | Mod: PBBFAC,,, | Performed by: ORTHOPAEDIC SURGERY

## 2018-07-05 NOTE — PROGRESS NOTES
Subjective:       Patient ID: Rigoberto Hughes is a 67 y.o. female.    Chief Complaint: Pre-op Exam of the Right Knee and Pre-op Exam of the Left Knee      Rigoberto Hughes is a 67 y.o. female with PMH significant for HTN, and HLD.  She is here today for a pre-operative visit in preparation for a Bilateral total knee arthroplasty to be performed by  Dr. Mead on 2018.  Rigoberto Hughes has a 5 year history of Bilateral knee pain that has gotten significantly worse in the last 6 months. Pain is worse with activity and weight bearing. Patient has experienced interference of ADLs due to increased pain and decreased range of motion. Prior to these last 6 months she was ambulating independently but now requires a walker while at home and wheelchair in public. Patient has failed non-operative treatment including NSAIDs, activity modification, narcotic pain medication, and corticosteroid injections. There has been no significant change in medical status since last visit.  She was seen by Dr. Coleman who referred her to us for treatment and management.      Past Medical History:   Diagnosis Date    Arthritis     Hyperlipidemia     Hypertension      Past Surgical History:   Procedure Laterality Date     SECTION      UMBILICAL HERNIA REPAIR       Family History   Problem Relation Age of Onset    Heart disease Mother     Diabetes Father     Heart disease Father      Social History     Social History    Marital status: Single     Spouse name: N/A    Number of children: N/A    Years of education: N/A     Social History Main Topics    Smoking status: Never Smoker    Smokeless tobacco: Never Used    Alcohol use No    Drug use: No    Sexual activity: No     Other Topics Concern    None     Social History Narrative    None       Current Outpatient Prescriptions   Medication Sig Dispense Refill    aspirin (ECOTRIN) 81 MG EC tablet Take 81 mg by mouth once daily.      b complex vitamins tablet Take 1  "tablet by mouth once daily.      carvedilol (COREG) 3.125 MG tablet Take 3.125 mg by mouth 2 (two) times daily.      folic acid/multivit-min/lutein (CENTRUM SILVER ORAL) Take 1 tablet by mouth once daily.      HYDROcodone-acetaminophen (NORCO)  mg per tablet Take 1 tablet by mouth every 12 (twelve) hours as needed for Pain. 30 tablet 0    losartan (COZAAR) 100 MG tablet Take 100 mg by mouth once daily.      simvastatin (ZOCOR) 20 MG tablet Take 20 mg by mouth every evening.       No current facility-administered medications for this visit.      Review of patient's allergies indicates:  No Known Allergies    Review of Systems   Constitutional: Negative for chills and fever.   Respiratory: Negative for chest tightness and shortness of breath.    Cardiovascular: Negative for chest pain and palpitations.   Gastrointestinal: Negative for abdominal pain and blood in stool.   Genitourinary: Negative for dysuria and hematuria.   Musculoskeletal: Positive for arthralgias, gait problem and joint swelling. Negative for neck stiffness.   Skin: Negative for rash and wound.   Neurological: Negative for syncope.   Psychiatric/Behavioral: Negative for confusion.       Objective:      Vitals:    07/05/18 1456   BP: 123/73   BP Location: Right arm   Patient Position: Sitting   BP Method: Large (Automatic)   Pulse: 68   Temp: 97.6 °F (36.4 °C)   Weight: 68.6 kg (151 lb 3.8 oz)   Height: 5' 3" (1.6 m)     Physical Exam   Constitutional: She is oriented to person, place, and time. She appears well-developed and well-nourished.   Cardiovascular: Normal rate, regular rhythm and normal heart sounds.  Exam reveals no gallop and no friction rub.    No murmur heard.  Pulmonary/Chest: Effort normal and breath sounds normal. No respiratory distress. She has no wheezes. She has no rales.   Abdominal: Soft. She exhibits no distension. There is no tenderness.   Neurological: She is alert and oriented to person, place, and time.   Skin: " Skin is warm. Capillary refill takes less than 2 seconds. No rash noted.   Psychiatric: She has a normal mood and affect. Her behavior is normal.   Vitals reviewed.    RIGHT KNEE:  Resisted SLR negative.   The skin over the knee is intact.  Knee effusion 1+  Tendernes is located globally.   Range of motion- Flexion 95 deg, Extension 10 deg,   Ligament exam:              MCL 2+              Lachman intact              Post sag intact              LCL intact  Patellar apprehension negative.  Popliteal cyst positive  Patellar crepitation present.  Pulses DP present, PT present.  Motor normal 5/5 strength in all tested muscle groups.   Sensory normal.    LEFT KNEE: Same     Lab Review:   CBC:   Lab Results   Component Value Date    WBC 9.01 06/28/2018    RBC 4.23 06/28/2018    HGB 10.6 (L) 06/28/2018    HCT 33.8 (L) 06/28/2018     (H) 06/28/2018     BMP:   Lab Results   Component Value Date     06/28/2018     06/28/2018    K 4.3 06/28/2018     06/28/2018    CO2 23 06/28/2018    BUN 26 (H) 06/28/2018    CREATININE 1.2 06/28/2018    CALCIUM 10.2 06/28/2018     Diagnostics Review: X-Ray: Reviewed and shows bilateral severe osteoarthritis/ end-stage knees.     Assessment:       1. Primary osteoarthritis of both knees        Plan:       Healthy female. Plan for bilateral total knee replacement on 07/11/2018.      Pre, renetta, and post operative procedures and expectations discussed. All questions were answered.   Rigoberto Hughes will contact us if there are any questions, concerns, or changes in medical status prior to surgery.  The patient's son was present for the entire interview, he provided some of the information above and states understanding of both himself and his mother.

## 2018-07-10 RX ORDER — SIMVASTATIN 20 MG/1
20 TABLET, FILM COATED ORAL NIGHTLY
Qty: 90 TABLET | Refills: 1 | Status: SHIPPED | OUTPATIENT
Start: 2018-07-10 | End: 2018-09-24 | Stop reason: SDUPTHER

## 2018-07-10 RX ORDER — CARVEDILOL 3.12 MG/1
3.12 TABLET ORAL 2 TIMES DAILY
Qty: 180 TABLET | Refills: 1 | Status: SHIPPED | OUTPATIENT
Start: 2018-07-10 | End: 2018-09-24 | Stop reason: SDUPTHER

## 2018-07-13 ENCOUNTER — ANESTHESIA (OUTPATIENT)
Dept: SURGERY | Facility: HOSPITAL | Age: 68
DRG: 462 | End: 2018-07-13
Payer: COMMERCIAL

## 2018-07-13 ENCOUNTER — HOSPITAL ENCOUNTER (INPATIENT)
Facility: HOSPITAL | Age: 68
LOS: 2 days | Discharge: HOME HEALTH | DRG: 462 | End: 2018-07-15
Attending: ORTHOPAEDIC SURGERY | Admitting: ORTHOPAEDIC SURGERY
Payer: COMMERCIAL

## 2018-07-13 ENCOUNTER — SURGERY (OUTPATIENT)
Age: 68
End: 2018-07-13

## 2018-07-13 DIAGNOSIS — M17.0 PRIMARY OSTEOARTHRITIS OF BOTH KNEES: ICD-10-CM

## 2018-07-13 LAB
ABO + RH BLD: NORMAL
BLD GP AB SCN CELLS X3 SERPL QL: NORMAL

## 2018-07-13 PROCEDURE — 63600175 PHARM REV CODE 636 W HCPCS: Performed by: ORTHOPAEDIC SURGERY

## 2018-07-13 PROCEDURE — 0SRD0J9 REPLACEMENT OF LEFT KNEE JOINT WITH SYNTHETIC SUBSTITUTE, CEMENTED, OPEN APPROACH: ICD-10-PCS | Performed by: ORTHOPAEDIC SURGERY

## 2018-07-13 PROCEDURE — 27201423 OPTIME MED/SURG SUP & DEVICES STERILE SUPPLY: Performed by: ORTHOPAEDIC SURGERY

## 2018-07-13 PROCEDURE — 71000039 HC RECOVERY, EACH ADD'L HOUR: Performed by: ORTHOPAEDIC SURGERY

## 2018-07-13 PROCEDURE — 71000033 HC RECOVERY, INTIAL HOUR: Performed by: ORTHOPAEDIC SURGERY

## 2018-07-13 PROCEDURE — C1713 ANCHOR/SCREW BN/BN,TIS/BN: HCPCS | Performed by: ORTHOPAEDIC SURGERY

## 2018-07-13 PROCEDURE — 37000008 HC ANESTHESIA 1ST 15 MINUTES: Performed by: ORTHOPAEDIC SURGERY

## 2018-07-13 PROCEDURE — 27447 TOTAL KNEE ARTHROPLASTY: CPT | Mod: 50,,, | Performed by: ORTHOPAEDIC SURGERY

## 2018-07-13 PROCEDURE — 36415 COLL VENOUS BLD VENIPUNCTURE: CPT

## 2018-07-13 PROCEDURE — S0020 INJECTION, BUPIVICAINE HYDRO: HCPCS | Performed by: STUDENT IN AN ORGANIZED HEALTH CARE EDUCATION/TRAINING PROGRAM

## 2018-07-13 PROCEDURE — 97530 THERAPEUTIC ACTIVITIES: CPT

## 2018-07-13 PROCEDURE — 36000710: Performed by: ORTHOPAEDIC SURGERY

## 2018-07-13 PROCEDURE — 37000009 HC ANESTHESIA EA ADD 15 MINS: Performed by: ORTHOPAEDIC SURGERY

## 2018-07-13 PROCEDURE — 27447 TOTAL KNEE ARTHROPLASTY: CPT | Mod: AS,50,, | Performed by: ORTHOPAEDIC SURGERY

## 2018-07-13 PROCEDURE — 25000003 PHARM REV CODE 250: Performed by: ANESTHESIOLOGY

## 2018-07-13 PROCEDURE — 86901 BLOOD TYPING SEROLOGIC RH(D): CPT

## 2018-07-13 PROCEDURE — 0SRC0J9 REPLACEMENT OF RIGHT KNEE JOINT WITH SYNTHETIC SUBSTITUTE, CEMENTED, OPEN APPROACH: ICD-10-PCS | Performed by: ORTHOPAEDIC SURGERY

## 2018-07-13 PROCEDURE — 63600175 PHARM REV CODE 636 W HCPCS: Performed by: ANESTHESIOLOGY

## 2018-07-13 PROCEDURE — C1776 JOINT DEVICE (IMPLANTABLE): HCPCS | Performed by: ORTHOPAEDIC SURGERY

## 2018-07-13 PROCEDURE — 36000711: Performed by: ORTHOPAEDIC SURGERY

## 2018-07-13 PROCEDURE — 25000003 PHARM REV CODE 250: Performed by: ORTHOPAEDIC SURGERY

## 2018-07-13 PROCEDURE — 27200671 HC STIMUCATH NEEDLE/ CATHETER: Performed by: STUDENT IN AN ORGANIZED HEALTH CARE EDUCATION/TRAINING PROGRAM

## 2018-07-13 PROCEDURE — 25000003 PHARM REV CODE 250: Performed by: STUDENT IN AN ORGANIZED HEALTH CARE EDUCATION/TRAINING PROGRAM

## 2018-07-13 PROCEDURE — G8978 MOBILITY CURRENT STATUS: HCPCS | Mod: CL

## 2018-07-13 PROCEDURE — 76942 ECHO GUIDE FOR BIOPSY: CPT | Performed by: STUDENT IN AN ORGANIZED HEALTH CARE EDUCATION/TRAINING PROGRAM

## 2018-07-13 PROCEDURE — 11000001 HC ACUTE MED/SURG PRIVATE ROOM

## 2018-07-13 PROCEDURE — 25000003 PHARM REV CODE 250: Performed by: NURSE PRACTITIONER

## 2018-07-13 PROCEDURE — 27800516 HC TRAY, EPIDURAL COMBO: Performed by: ANESTHESIOLOGY

## 2018-07-13 PROCEDURE — 97165 OT EVAL LOW COMPLEX 30 MIN: CPT

## 2018-07-13 PROCEDURE — G8979 MOBILITY GOAL STATUS: HCPCS | Mod: CK

## 2018-07-13 PROCEDURE — 97161 PT EVAL LOW COMPLEX 20 MIN: CPT

## 2018-07-13 PROCEDURE — 94761 N-INVAS EAR/PLS OXIMETRY MLT: CPT

## 2018-07-13 PROCEDURE — 64448 NJX AA&/STRD FEM NRV NFS IMG: CPT | Performed by: STUDENT IN AN ORGANIZED HEALTH CARE EDUCATION/TRAINING PROGRAM

## 2018-07-13 DEVICE — COMPONENT FEM POST SZ 3 RIGHT: Type: IMPLANTABLE DEVICE | Site: KNEE | Status: FUNCTIONAL

## 2018-07-13 DEVICE — COMPONENT FEM POST SZ 3 LEFT: Type: IMPLANTABLE DEVICE | Site: KNEE | Status: FUNCTIONAL

## 2018-07-13 DEVICE — COMPONENT PATELLA PFCSIG 32MM: Type: IMPLANTABLE DEVICE | Site: KNEE | Status: FUNCTIONAL

## 2018-07-13 DEVICE — KIT PIN STEINMANN PFC .025X5IN: Type: IMPLANTABLE DEVICE | Site: KNEE | Status: FUNCTIONAL

## 2018-07-13 DEVICE — IMPLANTABLE DEVICE: Type: IMPLANTABLE DEVICE | Site: KNEE | Status: FUNCTIONAL

## 2018-07-13 DEVICE — CEMENT BONE SMRTST GENTEC 40GR: Type: IMPLANTABLE DEVICE | Site: KNEE | Status: FUNCTIONAL

## 2018-07-13 DEVICE — BASEPLATE TIBIAL CEM MOD SZ 2: Type: IMPLANTABLE DEVICE | Site: KNEE | Status: FUNCTIONAL

## 2018-07-13 RX ORDER — LIDOCAINE HYDROCHLORIDE 10 MG/ML
1 INJECTION, SOLUTION EPIDURAL; INFILTRATION; INTRACAUDAL; PERINEURAL ONCE
Status: DISCONTINUED | OUTPATIENT
Start: 2018-07-13 | End: 2018-07-13 | Stop reason: HOSPADM

## 2018-07-13 RX ORDER — SODIUM CHLORIDE 9 MG/ML
INJECTION, SOLUTION INTRAVENOUS CONTINUOUS
Status: DISCONTINUED | OUTPATIENT
Start: 2018-07-13 | End: 2018-07-14

## 2018-07-13 RX ORDER — HYDROMORPHONE HYDROCHLORIDE 2 MG/ML
0.2 INJECTION, SOLUTION INTRAMUSCULAR; INTRAVENOUS; SUBCUTANEOUS EVERY 6 HOURS PRN
Status: DISCONTINUED | OUTPATIENT
Start: 2018-07-13 | End: 2018-07-14

## 2018-07-13 RX ORDER — SODIUM CHLORIDE 0.9 % (FLUSH) 0.9 %
3 SYRINGE (ML) INJECTION
Status: DISCONTINUED | OUTPATIENT
Start: 2018-07-13 | End: 2018-07-13

## 2018-07-13 RX ORDER — ONDANSETRON 2 MG/ML
4 INJECTION INTRAMUSCULAR; INTRAVENOUS EVERY 6 HOURS PRN
Status: DISCONTINUED | OUTPATIENT
Start: 2018-07-13 | End: 2018-07-15 | Stop reason: HOSPADM

## 2018-07-13 RX ORDER — FENTANYL CITRATE 50 UG/ML
INJECTION, SOLUTION INTRAMUSCULAR; INTRAVENOUS
Status: DISCONTINUED | OUTPATIENT
Start: 2018-07-13 | End: 2018-07-13

## 2018-07-13 RX ORDER — HYDROMORPHONE HYDROCHLORIDE 2 MG/ML
0.5 INJECTION, SOLUTION INTRAMUSCULAR; INTRAVENOUS; SUBCUTANEOUS EVERY 5 MIN PRN
Status: DISCONTINUED | OUTPATIENT
Start: 2018-07-13 | End: 2018-07-13 | Stop reason: HOSPADM

## 2018-07-13 RX ORDER — BUPIVACAINE HYDROCHLORIDE 5 MG/ML
INJECTION, SOLUTION EPIDURAL; INTRACAUDAL
Status: DISCONTINUED | OUTPATIENT
Start: 2018-07-13 | End: 2018-07-13

## 2018-07-13 RX ORDER — SODIUM CHLORIDE 0.9 % (FLUSH) 0.9 %
3 SYRINGE (ML) INJECTION
Status: DISCONTINUED | OUTPATIENT
Start: 2018-07-13 | End: 2018-07-13 | Stop reason: HOSPADM

## 2018-07-13 RX ORDER — SIMVASTATIN 20 MG/1
20 TABLET, FILM COATED ORAL NIGHTLY
Status: DISCONTINUED | OUTPATIENT
Start: 2018-07-13 | End: 2018-07-15 | Stop reason: HOSPADM

## 2018-07-13 RX ORDER — LIDOCAINE HCL/PF 100 MG/5ML
SYRINGE (ML) INTRAVENOUS
Status: DISCONTINUED | OUTPATIENT
Start: 2018-07-13 | End: 2018-07-13

## 2018-07-13 RX ORDER — LOSARTAN POTASSIUM 50 MG/1
100 TABLET ORAL DAILY
Status: DISCONTINUED | OUTPATIENT
Start: 2018-07-14 | End: 2018-07-15 | Stop reason: HOSPADM

## 2018-07-13 RX ORDER — MIDAZOLAM HYDROCHLORIDE 1 MG/ML
INJECTION, SOLUTION INTRAMUSCULAR; INTRAVENOUS
Status: DISCONTINUED | OUTPATIENT
Start: 2018-07-13 | End: 2018-07-13

## 2018-07-13 RX ORDER — ONDANSETRON 2 MG/ML
4 INJECTION INTRAMUSCULAR; INTRAVENOUS DAILY PRN
Status: DISCONTINUED | OUTPATIENT
Start: 2018-07-13 | End: 2018-07-13 | Stop reason: HOSPADM

## 2018-07-13 RX ORDER — SODIUM CHLORIDE 0.9 % (FLUSH) 0.9 %
3 SYRINGE (ML) INJECTION EVERY 8 HOURS
Status: DISCONTINUED | OUTPATIENT
Start: 2018-07-13 | End: 2018-07-13 | Stop reason: HOSPADM

## 2018-07-13 RX ORDER — CEFAZOLIN SODIUM 2 G/50ML
2 SOLUTION INTRAVENOUS
Status: COMPLETED | OUTPATIENT
Start: 2018-07-13 | End: 2018-07-13

## 2018-07-13 RX ORDER — NAPROXEN 500 MG/1
500 TABLET ORAL
Status: COMPLETED | OUTPATIENT
Start: 2018-07-13 | End: 2018-07-13

## 2018-07-13 RX ORDER — OXYCODONE HYDROCHLORIDE 5 MG/1
10 TABLET ORAL
Status: DISCONTINUED | OUTPATIENT
Start: 2018-07-13 | End: 2018-07-15 | Stop reason: HOSPADM

## 2018-07-13 RX ORDER — LOSARTAN POTASSIUM 50 MG/1
100 TABLET ORAL DAILY
Status: DISCONTINUED | OUTPATIENT
Start: 2018-07-13 | End: 2018-07-13

## 2018-07-13 RX ORDER — MUPIROCIN 20 MG/G
OINTMENT TOPICAL
Status: DISCONTINUED | OUTPATIENT
Start: 2018-07-13 | End: 2018-07-13 | Stop reason: HOSPADM

## 2018-07-13 RX ORDER — PROPOFOL 10 MG/ML
VIAL (ML) INTRAVENOUS CONTINUOUS PRN
Status: DISCONTINUED | OUTPATIENT
Start: 2018-07-13 | End: 2018-07-13

## 2018-07-13 RX ORDER — ONDANSETRON 4 MG/1
4 TABLET, ORALLY DISINTEGRATING ORAL EVERY 6 HOURS PRN
Status: DISCONTINUED | OUTPATIENT
Start: 2018-07-13 | End: 2018-07-15 | Stop reason: HOSPADM

## 2018-07-13 RX ORDER — BISACODYL 10 MG
10 SUPPOSITORY, RECTAL RECTAL DAILY PRN
Status: DISCONTINUED | OUTPATIENT
Start: 2018-07-13 | End: 2018-07-15 | Stop reason: HOSPADM

## 2018-07-13 RX ORDER — AMOXICILLIN 250 MG
1 CAPSULE ORAL 2 TIMES DAILY
Status: DISCONTINUED | OUTPATIENT
Start: 2018-07-13 | End: 2018-07-15 | Stop reason: HOSPADM

## 2018-07-13 RX ORDER — ACETAMINOPHEN 500 MG
1000 TABLET ORAL 3 TIMES DAILY
Status: DISCONTINUED | OUTPATIENT
Start: 2018-07-13 | End: 2018-07-15 | Stop reason: HOSPADM

## 2018-07-13 RX ORDER — SODIUM CHLORIDE 0.9 % (FLUSH) 0.9 %
5 SYRINGE (ML) INJECTION
Status: DISCONTINUED | OUTPATIENT
Start: 2018-07-13 | End: 2018-07-15 | Stop reason: HOSPADM

## 2018-07-13 RX ORDER — LIDOCAINE HCL/EPINEPHRINE/PF 2%-1:200K
VIAL (ML) INJECTION
Status: DISCONTINUED | OUTPATIENT
Start: 2018-07-13 | End: 2018-07-13

## 2018-07-13 RX ORDER — ASPIRIN 81 MG/1
81 TABLET ORAL DAILY
Status: DISCONTINUED | OUTPATIENT
Start: 2018-07-13 | End: 2018-07-15 | Stop reason: HOSPADM

## 2018-07-13 RX ORDER — PROPOFOL 10 MG/ML
VIAL (ML) INTRAVENOUS
Status: DISCONTINUED | OUTPATIENT
Start: 2018-07-13 | End: 2018-07-13

## 2018-07-13 RX ORDER — ACETAMINOPHEN 500 MG
1000 TABLET ORAL
Status: COMPLETED | OUTPATIENT
Start: 2018-07-13 | End: 2018-07-13

## 2018-07-13 RX ORDER — CARVEDILOL 3.12 MG/1
3.12 TABLET ORAL 2 TIMES DAILY
Status: DISCONTINUED | OUTPATIENT
Start: 2018-07-13 | End: 2018-07-15 | Stop reason: HOSPADM

## 2018-07-13 RX ORDER — OXYCODONE HYDROCHLORIDE 5 MG/1
5 TABLET ORAL
Status: DISCONTINUED | OUTPATIENT
Start: 2018-07-13 | End: 2018-07-15 | Stop reason: HOSPADM

## 2018-07-13 RX ORDER — SODIUM CHLORIDE, SODIUM LACTATE, POTASSIUM CHLORIDE, CALCIUM CHLORIDE 600; 310; 30; 20 MG/100ML; MG/100ML; MG/100ML; MG/100ML
INJECTION, SOLUTION INTRAVENOUS CONTINUOUS PRN
Status: DISCONTINUED | OUTPATIENT
Start: 2018-07-13 | End: 2018-07-13

## 2018-07-13 RX ORDER — SODIUM CHLORIDE, SODIUM LACTATE, POTASSIUM CHLORIDE, CALCIUM CHLORIDE 600; 310; 30; 20 MG/100ML; MG/100ML; MG/100ML; MG/100ML
INJECTION, SOLUTION INTRAVENOUS CONTINUOUS
Status: DISCONTINUED | OUTPATIENT
Start: 2018-07-13 | End: 2018-07-13

## 2018-07-13 RX ORDER — PREGABALIN 75 MG/1
150 CAPSULE ORAL
Status: COMPLETED | OUTPATIENT
Start: 2018-07-13 | End: 2018-07-13

## 2018-07-13 RX ORDER — LACTULOSE 10 G/15ML
20 SOLUTION ORAL EVERY 6 HOURS PRN
Status: DISCONTINUED | OUTPATIENT
Start: 2018-07-13 | End: 2018-07-15 | Stop reason: HOSPADM

## 2018-07-13 RX ORDER — POLYETHYLENE GLYCOL 3350 17 G/17G
17 POWDER, FOR SOLUTION ORAL DAILY
Status: DISCONTINUED | OUTPATIENT
Start: 2018-07-13 | End: 2018-07-15 | Stop reason: HOSPADM

## 2018-07-13 RX ORDER — FAMOTIDINE 20 MG/1
20 TABLET, FILM COATED ORAL 2 TIMES DAILY
Status: DISCONTINUED | OUTPATIENT
Start: 2018-07-13 | End: 2018-07-15 | Stop reason: HOSPADM

## 2018-07-13 RX ORDER — CEFAZOLIN SODIUM 2 G/50ML
2 SOLUTION INTRAVENOUS
Status: COMPLETED | OUTPATIENT
Start: 2018-07-13 | End: 2018-07-14

## 2018-07-13 RX ORDER — TRANEXAMIC ACID 100 MG/ML
1000 INJECTION, SOLUTION INTRAVENOUS
Status: COMPLETED | OUTPATIENT
Start: 2018-07-13 | End: 2018-07-13

## 2018-07-13 RX ORDER — OXYCODONE HCL 10 MG/1
10 TABLET, FILM COATED, EXTENDED RELEASE ORAL
Status: COMPLETED | OUTPATIENT
Start: 2018-07-13 | End: 2018-07-13

## 2018-07-13 RX ORDER — BUPIVACAINE HYDROCHLORIDE 7.5 MG/ML
INJECTION, SOLUTION INTRASPINAL
Status: DISCONTINUED | OUTPATIENT
Start: 2018-07-13 | End: 2018-07-13

## 2018-07-13 RX ORDER — TRANEXAMIC ACID
POWDER (GRAM) MISCELLANEOUS
Status: DISCONTINUED | OUTPATIENT
Start: 2018-07-13 | End: 2018-07-13 | Stop reason: HOSPADM

## 2018-07-13 RX ADMIN — CARVEDILOL 3.12 MG: 3.12 TABLET, FILM COATED ORAL at 08:07

## 2018-07-13 RX ADMIN — BUPIVACAINE HYDROCHLORIDE 1.6 ML: 7.5 INJECTION, SOLUTION SUBARACHNOID at 07:07

## 2018-07-13 RX ADMIN — OXYCODONE HYDROCHLORIDE 10 MG: 5 TABLET ORAL at 02:07

## 2018-07-13 RX ADMIN — SODIUM CHLORIDE, SODIUM LACTATE, POTASSIUM CHLORIDE, AND CALCIUM CHLORIDE: .6; .31; .03; .02 INJECTION, SOLUTION INTRAVENOUS at 06:07

## 2018-07-13 RX ADMIN — SODIUM CHLORIDE: 0.9 INJECTION, SOLUTION INTRAVENOUS at 02:07

## 2018-07-13 RX ADMIN — ACETAMINOPHEN 1000 MG: 500 TABLET, FILM COATED ORAL at 02:07

## 2018-07-13 RX ADMIN — MUPIROCIN: 20 OINTMENT TOPICAL at 06:07

## 2018-07-13 RX ADMIN — SODIUM CHLORIDE, SODIUM LACTATE, POTASSIUM CHLORIDE, AND CALCIUM CHLORIDE: .6; .31; .03; .02 INJECTION, SOLUTION INTRAVENOUS at 08:07

## 2018-07-13 RX ADMIN — PROPOFOL 35 MCG/KG/MIN: 10 INJECTION, EMULSION INTRAVENOUS at 07:07

## 2018-07-13 RX ADMIN — LIDOCAINE HYDROCHLORIDE,EPINEPHRINE BITARTRATE 3 ML: 20; .005 INJECTION, SOLUTION EPIDURAL; INFILTRATION; INTRACAUDAL; PERINEURAL at 10:07

## 2018-07-13 RX ADMIN — ONDANSETRON 4 MG: 2 INJECTION INTRAMUSCULAR; INTRAVENOUS at 12:07

## 2018-07-13 RX ADMIN — MIDAZOLAM 1 MG: 1 INJECTION INTRAMUSCULAR; INTRAVENOUS at 06:07

## 2018-07-13 RX ADMIN — TRANEXAMIC ACID 1 G: 100 INJECTION, SOLUTION INTRAVENOUS at 07:07

## 2018-07-13 RX ADMIN — PREGABALIN 150 MG: 75 CAPSULE ORAL at 06:07

## 2018-07-13 RX ADMIN — ACETAMINOPHEN 1000 MG: 500 TABLET, FILM COATED ORAL at 08:07

## 2018-07-13 RX ADMIN — ASPIRIN 81 MG: 81 TABLET, COATED ORAL at 02:07

## 2018-07-13 RX ADMIN — Medication 20 ML: at 07:07

## 2018-07-13 RX ADMIN — NAPROXEN 500 MG: 500 TABLET ORAL at 06:07

## 2018-07-13 RX ADMIN — FAMOTIDINE 20 MG: 20 TABLET ORAL at 08:07

## 2018-07-13 RX ADMIN — ACETAMINOPHEN 1000 MG: 500 TABLET, FILM COATED ORAL at 06:07

## 2018-07-13 RX ADMIN — HYDROMORPHONE HYDROCHLORIDE 0.2 MG: 2 INJECTION, SOLUTION INTRAMUSCULAR; INTRAVENOUS; SUBCUTANEOUS at 08:07

## 2018-07-13 RX ADMIN — SIMVASTATIN 20 MG: 20 TABLET, FILM COATED ORAL at 08:07

## 2018-07-13 RX ADMIN — LIDOCAINE HYDROCHLORIDE 50 MG: 20 INJECTION, SOLUTION INTRAVENOUS at 07:07

## 2018-07-13 RX ADMIN — FENTANYL CITRATE 10 MCG: 50 INJECTION, SOLUTION INTRAMUSCULAR; INTRAVENOUS at 07:07

## 2018-07-13 RX ADMIN — CEFAZOLIN SODIUM 2 G: 2 SOLUTION INTRAVENOUS at 04:07

## 2018-07-13 RX ADMIN — PROPOFOL 20 MG: 10 INJECTION, EMULSION INTRAVENOUS at 07:07

## 2018-07-13 RX ADMIN — OXYCODONE HYDROCHLORIDE 10 MG: 10 TABLET, FILM COATED, EXTENDED RELEASE ORAL at 06:07

## 2018-07-13 RX ADMIN — SENNOSIDES AND DOCUSATE SODIUM 1 TABLET: 8.6; 5 TABLET ORAL at 08:07

## 2018-07-13 RX ADMIN — MIDAZOLAM 1 MG: 1 INJECTION INTRAMUSCULAR; INTRAVENOUS at 07:07

## 2018-07-13 RX ADMIN — CEFAZOLIN SODIUM 2 G: 2 SOLUTION INTRAVENOUS at 07:07

## 2018-07-13 RX ADMIN — BUPIVACAINE HYDROCHLORIDE 20 ML: 5 INJECTION, SOLUTION EPIDURAL; INTRACAUDAL; PERINEURAL at 10:07

## 2018-07-13 RX ADMIN — LIDOCAINE HYDROCHLORIDE,EPINEPHRINE BITARTRATE 2 ML: 20; .005 INJECTION, SOLUTION EPIDURAL; INFILTRATION; INTRACAUDAL; PERINEURAL at 10:07

## 2018-07-13 RX ADMIN — CEFAZOLIN SODIUM 1 G: 2 SOLUTION INTRAVENOUS at 09:07

## 2018-07-13 NOTE — PROGRESS NOTES
Pt arrived to unit from PACU via stretcher. AAOx4. VSS. NAD noted. IVF initiated on pt, per MD order. Bedrails up x 2. Bed low and locked, alarm on. Son at the bedside. Fall precautions maintained. Call light within reach. See full documented assessment on pertinent flowsheets. Will continue to monitor.

## 2018-07-13 NOTE — BRIEF OP NOTE
[facility]  Brief Operative Note    SUMMARY     Surgery Date: 7/13/2018     Surgeon(s) and Role:     * Baljit Mead MD - Primary    Assisting Surgeon: None    Pre-op Diagnosis:  Primary osteoarthritis of both knees [M17.0]    Post-op Diagnosis:  Post-Op Diagnosis Codes:     * Primary osteoarthritis of both knees [M17.0]    Procedure(s) (LRB):  ARTHROPLASTY-KNEE-BILATERAL (Bilateral)    Anesthesia: Choice    Description of Procedure: bilateral total knee arthroplasty     Description of the findings of the procedure: severe bilateral arthritis    Estimated Blood Loss: 20 mL         Specimens:   Specimen (12h ago through future)    None

## 2018-07-13 NOTE — PLAN OF CARE
Problem: Physical Therapy Goal  Goal: Physical Therapy Goal  Goals to be met by: 18     Patient will increase functional independence with mobility by performin. Supine to sit with MInimal Assistance  2. Sit to supine with MInimal Assistance  3. Sit to stand transfer with Contact Guard Assistance  4. Bed to chair transfer with Contact Guard Assistance using Rolling Walker  5. Gait  x 50 feet with Contact Guard Assistance using Rolling Walker.   6. Lower extremity exercise program x 10 reps per handout, with assistance as needed    Outcome: Ongoing (interventions implemented as appropriate)  PT evaluation completed, note to follow. Pt with significant pain in B knees limiting tolerance to activity today. Pt required mod A to stand then min A to side step left and forward/backward 3 steps. Recommending IPR at this time.

## 2018-07-13 NOTE — ANESTHESIA PROCEDURE NOTES
CSE    Patient location during procedure: OR  Start time: 7/13/2018 7:00 AM  Timeout: 7/13/2018 7:00 AM  End time: 7/13/2018 7:10 AM  Reason for block: at surgeon's request and post-op pain management  Staffing  Anesthesiologist: SANDY CELAYA  Resident/CRNA: JOSE JUAN POWERS  Performed: resident/CRNA   Preanesthetic Checklist  Completed: patient identified, site marked, surgical consent, pre-op evaluation, timeout performed, IV checked, risks and benefits discussed and monitors and equipment checked  CSE  Patient position: sitting  Prep: ChloraPrep  Patient monitoring: heart rate, cardiac monitor, continuous pulse ox, continuous capnometry and frequent blood pressure checks  Approach: midline  Spinal Needle  Needle type: pencil-tip   Needle gauge: 25 G  Needle length: 5 in  Epidural Needle  Injection technique: AIDEE air  Needle type: Tuohy   Needle gauge: 17 G  Needle length: 3.5 in  Needle insertion depth: 4.5 cm  Location: L3-4  Needle localization: anatomical landmarks  Catheter  Catheter type: [employer]  Catheter size: 18 G  Catheter at skin depth: 14 cm  Test dose: lidocaine 2% with Epi 1-to-200,000 and negative  Additional Documentation: incremental injection, negative aspiration for CSF and negative aspiration for heme  Assessment  Sensory level: T10   Dermatomal levels determined by alcohol swab  Intrathecal Medications:  Bolus administered: 1.6 mL of 0.75 bupivacaine  administered: primary anesthetic and 10 mcg of  fentanyl

## 2018-07-13 NOTE — OP NOTE
INDICATION/PREOPERATIVE DIAGNOSIS: Osteoarthritis of the bilaterallyknee.    POSTOPERATIVE DIAGNOSIS: Same.    DATE OF SURGERY: 7/13/2018    NAME OF PROCEDURE:bilaterally total knee replacement.    SURGEON: Baljit Mead MD    ASSISTANT: BETH Melchor    IMPLANT SYSTEM: Depuy PFC Sigma PS     EBL: trace    Right knee-    DESCRIPTION OF OPERATION: The patient was taken to the Operating Room. spinal anesthesia was administered and preoperative antibiotics were given. A tourniquet was placed on the proximal thigh. The lower extremity was prepped and draped in the usual sterile fashion. It was exsanguinated with an Esmarch and the tourniquet was inflated to 300 mmHg. An anterior incision was made. Sharp dissection was carried down to the extensor mechanism. A medial parapatellar arthrotomy was performed. The proximal medial face of the tibia was exposed. The patella was everted. The knee was carefully flexed. The remnants of the anterior cruciate ligament and the patellofemoral ligament were released. The fat pad was resected with the anterior part of the lateral meniscus. Osteophytes around the femur were debrided. The femoral canal was entered with the drill. The alignment guide was inserted. The distal jig was applied in 7 degrees of valgus and the distal cut was made. The distal femur was measured as a size 3. The combination cutting block was applied in 3 degrees of external rotation Anterior, posterior and chamfer cuts were made. The center box was then cut at the same size. The meniscal remnants were resected. The proximal tibia was exposed. The surrounding soft tissues were protected with retractors. The external jig was applied in neutral alignment and a neutral cut was made. This cut was checked and found to be satisfactory. A lamina  was inserted with the knee in flexion. The posterior compartment was debrided of osteophytes and synovitis. The tibia was then drilled and punched with the size 2 base plate  in the appropriate rotational position, aligned with the tibial tubercle. Trial implants were inserted. There was excellent range of motion and stability with a size 12.5 mm spacer. The patella was then exposed. It was resected using the guide leaving 14 mm of residual bone. It was then drilled for the size 32 mm button. The trial had central tracking. The trials were all removed. The knee was irrigated and dried. Antibiotic cement was mixed. The implants were cemented with the knee held in extension and the patella clamped. After the cement had dried a careful search for extra cement was made and all was removed. The knee was irrigated. The final spacer was snapped into place. The extensor mechanism was closed with interrupted #1 Vicryl. This was checked in flexion and found to be secure. The joint was injected with 1g of Transexemic acid. The subcutaneous tissue was closed with 2-0 Vicryl. The skin was closed with clips and a clear plastic dressing with a ROBERT stocking was applied. The tourniquet was released. There were no known complications. I was present for the entire procedure.    After completion of the first total knee, I confirmed with the anesthetist that the patient was physiologically stable for the second procedure. A second dose of intravenous antibiotics was given.    Left knee-    A tourniquet was placed on the proximal thigh. The lower extremity was prepped and draped in the usual sterile fashion. It was exsanguinated with an Esmarch and the tourniquet was inflated to 300 mmHg. An anterior incision was made. Sharp dissection was carried down to the extensor mechanism. A medial parapatellar arthrotomy was performed. The proximal medial face of the tibia was exposed. The patella was everted. The knee was carefully flexed. The remnants of the anterior cruciate ligament and the patellofemoral ligament were released. The fat pad was resected with the anterior part of the lateral meniscus. Osteophytes  around the femur were debrided. The femoral canal was entered with the drill. The alignment guide was inserted. The distal jig was applied in 7 degrees of valgus and the distal cut was made. The distal femur was measured as a size 3. The combination cutting block was applied in 3 degrees of external rotation. Anterior, posterior and chamfer cuts were made. The center box was then cut at the same size. The meniscal remnants were resected. The proximal tibia was exposed. The surrounding soft tissues were protected with retractors. The external jig was applied in neutral alignment and a neutral cut was made. This cut was checked and found to be satisfactory. A lamina  was inserted with the knee in flexion. The posterior compartment was debrided of osteophytes and synovitis. The tibia was then drilled and punched with the size 2 base plate in the appropriate rotational position, aligned with the tibial tubercle. Trial implants were inserted. There was excellent range of motion and stability with a size 15 mm spacer. The patella was then exposed. It was resected using the guide leaving 14 mm of residual bone. It was then drilled for the size 32 mm button. The trial had central tracking. The trials were all removed. The knee was irrigated and dried. Antibiotic cement was mixed. The implants were cemented with the knee held in extension and the patella clamped. After the cement had dried a careful search for extra cement was made and all was removed. The knee was irrigated. The final spacer was snapped into place. The extensor mechanism was closed with interrupted #1 Vicryl. This was checked in flexion and found to be secure. The joint was injected with 1g of Transexemic acid. The subcutaneous tissue was closed with 2-0 Vicryl. The skin was closed with clips and a clear plastic dressing with a ROBERT stocking was applied. The tourniquet was released. There were no known complications. I was present for the entire  procedure.

## 2018-07-13 NOTE — PT/OT/SLP EVAL
Occupational Therapy   Evaluation    Name: Rigoberto Hughes  MRN: [id number]  Admitting Diagnosis:  Primary osteoarthritis of both knees Day of Surgery    Recommendations:     Discharge Recommendations: rehabilitation facility  Discharge Equipment Recommendations:  bedside commode  Barriers to discharge:  Decreased caregiver support    History:     Occupational Profile:  Son declining use of free language line and stating he would translate   Living Environment: Pt lives with son and grandson in [facility], no steps, tub/shower combo  Previous level of function: mod I with ADLs and functional mobility; assist/supervision with tub t/fs   Equipment Owned:  bath bench, wheelchair, walker, rolling  Assistance upon Discharge: from family, however, alone for periods of time while family is at work/school     Past Medical History:   Diagnosis Date    Arthritis     Hyperlipidemia     Hypertension        Past Surgical History:   Procedure Laterality Date     SECTION      UMBILICAL HERNIA REPAIR         Subjective     Chief Complaint: pain with movements, nausea   Patient/Family stated goals: return to PLOF   Communicated with: nsg prior to session.  Pain/Comfort:  · Pain Rating 1: 6/10  · Location - Side 1: Bilateral  · Location - Orientation 1: generalized  · Location 1: knee  · Pain Addressed 1: Reposition, Distraction, Cessation of Activity, Pre-medicate for activity, Nurse notified  · Pain Rating Post-Intervention 1: 7/10    Patients cultural, spiritual, Mormon conflicts given the current situation:      Objective:     Patient found with:      General Precautions: Standard, fall   Orthopedic Precautions:RLE weight bearing as tolerated, LLE weight bearing as tolerated   Braces: N/A     Occupational Performance:    Bed Mobility:    · Patient completed Scooting/Bridging with moderate assistance  · Patient completed Supine to Sit with moderate assistance  · Patient completed Sit to Supine with moderate  assistance    Functional Mobility/Transfers:  · Patient completed Sit <> Stand Transfer with moderate assistance  with  rolling walker   · Functional Mobility: Min A with RW forward and laterally to HOB     Activities of Daily Living:  · Lower Body Dressing: total assistance    · Toileting: total assistance livan     Cognitive/Visual Perceptual:  Cognitive/Psychosocial Skills:     -       Oriented to: Person, Place, Time and Situation   -       Follows Commands/attention:Follows multistep  commands  -       Communication: clear/fluent  -       Memory: No Deficits noted  -       Safety awareness/insight to disability: intact   -       Mood/Affect/Coping skills/emotional control: Appropriate to situation    Physical Exam:  Balance:    -       SBA sitting balance; limited tolerance for knee flexion; Min A standing   Postural examination/scapula alignment:    -       Rounded shoulders  -       Forward head  Skin integrity: Wound surgical B knees  Edema:  surgical B knees  Sensation:    -       Intact  Dominant hand:    -       right  Upper Extremity Range of Motion:   BUE ROM WFL, however L decreased end shoulder range   Upper Extremity Strength:  BUE strength grossly 4-/5    Strength:  Good   Fine Motor Coordination:    -       Intact    Patient left supine with all lines intact, call button in reach, bed alarm on, nsg notified and son  present    [facility] 6 Click:  [facility] Total Score: 16    Treatment & Education:  Pt performing skills as listed above  Pt re-educated on impt of elevating BLEs with correct placement of prop to encourage knee ext  Educated on use of ice pack/restricition/precautions   Functional mobility performed EOB following therapist demonstration of use of RW   Knees of bed locked into extension and education given on impt of knee extension   Education on d/c recs to son       Education:    Assessment:     Rigoberto Hughes is a 67 y.o. female with a medical diagnosis of Primary osteoarthritis of both  "knees.  She presents with B TKAs.  Performance deficits affecting function are weakness, impaired self care skills, impaired functional mobilty, impaired endurance, impaired balance, pain, decreased ROM, impaired skin, orthopedic precautions, edema, decreased lower extremity function, gait instability.  Pt would benefit from cont OT services in order to maximize functional independence. Recommending IPR at d/c at this time. Pt with B TKAs and now requiring assist all skills. Would benefit from IPR in order to return to mod I PLOF     Rehab Prognosis:  Good ; patient would benefit from acute skilled OT services to address these deficits and reach maximum level of function.         Clinical Decision Makin.  OT Low:  "Pt evaluation falls under low complexity for evaluation coding due to performance deficits noted in 1-3 areas as stated above and 0 co-morbities affecting current functional status. Data obtained from problem focused assessments. No modifications or assistance was required for completion of evaluation. Only brief occupational profile and history review completed."     Plan:     Patient to be seen 5 x/week to address the above listed problems via self-care/home management, therapeutic activities, therapeutic exercises  · Plan of Care Expires: 18  · Plan of Care Reviewed with: patient, son    This Plan of care has been discussed with the patient who was involved in its development and understands and is in agreement with the identified goals and treatment plan    GOALS:    Occupational Therapy Goals     Not on file                Time Tracking:     OT Date of Treatment: 18  OT Start Time: 1423  OT Stop Time: 1450  OT Total Time (min): 27 min    Billable Minutes:Evaluation 10  Therapeutic Activity 17    Rochelle Moore, [place]  2018    "

## 2018-07-13 NOTE — PT/OT/SLP EVAL
Physical Therapy Evaluation    Patient Name:  Rigoberto Hughes   MRN:  [id number]    Recommendations:     Discharge Recommendations:  rehabilitation facility   Discharge Equipment Recommendations: bedside commode   Barriers to discharge: Decreased caregiver support    Assessment:     Rigoberto Hughes is a 67 y.o. female admitted with a medical diagnosis of Primary osteoarthritis of both knees.  She presents with the following impairments/functional limitations:  weakness, impaired endurance, impaired self care skills, impaired functional mobilty, gait instability, impaired balance, decreased lower extremity function, pain, decreased ROM, edema, impaired skin. Pt with significant pain in B knees limiting tolerance to activity today. Pt required mod A to stand then min A to side step left and forward/backward 3 steps. Recommending IPR at this time.    Rehab Prognosis: Good; patient would benefit from acute skilled PT services to address these deficits and reach maximum level of function.      Recent Surgery: Procedure(s) (LRB):  ARTHROPLASTY-KNEE-BILATERAL (Bilateral) Day of Surgery    Plan:     During this hospitalization, patient to be seen BID to address the above listed problems via gait training, therapeutic activities, therapeutic exercises  · Plan of Care Expires:  08/13/18   Plan of Care Reviewed with: patient, son    Subjective     Communicated with JUAN JOSE Tijerina prior to session.  Patient found supine with HOB elevated upon PT entry to room, agreeable to evaluation.      Pt is Urdu speaking, pt's son present and providing translation throughout session.    Chief Complaint: B knee pain    Pain/Comfort:  · Pain Rating 1: 6/10  · Location - Side 1: Bilateral  · Location - Orientation 1: generalized  · Location 1: knee  · Pain Addressed 1: Reposition, Distraction, Cessation of Activity, Nurse notified, Pre-medicate for activity  · Pain Rating Post-Intervention 1: 7/10    Patients cultural, spiritual, Voodoo  conflicts given the current situation: none reported    Living Environment:  Pt lives with her son in a H with no KIMBERLY and tub/shower combo with TTB.  Prior to admission, patients level of function was mod I gait with RW, pt's son assist to ensure pt safely transferring in/out of tub, son prepares meals and drives.  Patient has the following equipment: bath bench, walker, rolling (transport w/c).  DME owned (not currently used): none.  Upon discharge, patient will have assistance from her son and grandson but there are ~3-4 hours a day pt will be alone (per pt's son).    Objective:     Patient found with: peripheral IV     General Precautions: Standard, fall   Orthopedic Precautions:LLE weight bearing as tolerated, RLE weight bearing as tolerated   Braces: N/A     Exams:  · Postural Exam:  Patient presented with the following abnormalities:    · -       Rounded shoulders  · -       Forward head  · Sensation:    · -       Intact  · Skin Integrity/Edema:      · -       Skin integrity: surgical incision B knees- ACE wrapped  · -       Edema: Mild BLEs  · RLE ROM: Deficits: knee ROM ~10-30 deg  · BLE Strength: Deficits: ankle DF WNLs, knee and hip ROM limited by pain  · LLE ROM: knee ROM ~10-30 deg    Functional Mobility:  · Bed Mobility:     · Supine to Sit: moderate assistance  · Sit to Supine: moderate assistance  · Transfers:     · Sit to Stand:  moderate assistance with rolling walker  · Gait: 3 side steps left and 3 steps forward/backward with RW and min A    AM-PAC 6 CLICK MOBILITY  Total Score:10       Therapeutic Activities and Exercises:  Pt requires increased time and effort to complete B knee flexion to place feet on the floor. Pt required mod A to stand then stood with CGA and took steps with min A with tolerance limited by pain.  Educated pt and pt's son on supine exercises of APs and heel slides, use of ice packs, and LE elevation without flexion of knees.    Patient left HOB elevated with all lines  intact, call button in reach, bed alarm on, RN notified and pt's son present.    GOALS:    Physical Therapy Goals        Problem: Physical Therapy Goal    Goal Priority Disciplines Outcome Goal Variances Interventions   Physical Therapy Goal     PT/OT, PT Ongoing (interventions implemented as appropriate)     Description:  Goals to be met by: 18     Patient will increase functional independence with mobility by performin. Supine to sit with MInimal Assistance  2. Sit to supine with MInimal Assistance  3. Sit to stand transfer with Contact Guard Assistance  4. Bed to chair transfer with Contact Guard Assistance using Rolling Walker  5. Gait  x 50 feet with Contact Guard Assistance using Rolling Walker.   6. Lower extremity exercise program x 10 reps per handout, with assistance as needed                      History:     Past Medical History:   Diagnosis Date    Arthritis     Hyperlipidemia     Hypertension        Past Surgical History:   Procedure Laterality Date     SECTION      UMBILICAL HERNIA REPAIR         Clinical Decision Making:     History  Co-morbidities and personal factors that may impact the plan of care Examination  Body Structures and Functions, activity limitations and participation restrictions that may impact the plan of care Clinical Presentation   Decision Making/ Complexity Score   Co-morbidities:   [] Time since onset of injury / illness / exacerbation  [x] Status of current condition  []Patient's cognitive status and safety concerns    [] Multiple Medical Problems (see med hx)  Personal Factors:   [x] Patient's age  [] Prior Level of function   [] Patient's home situation (environment and family support)  [] Patient's level of motivation  [] Expected progression of patient      HISTORY:(criteria)    [] 07186 - no personal factors/history    [x] 17752 - has 1-2 personal factor/comorbidity     [] 56528 - has >3 personal factor/comorbidity     Body Regions:  []  Objective examination findings  [] Head     []  Neck  [] Trunk   [] Upper Extremity  [x] Lower Extremity    Body Systems:  [] For communication ability, affect, cognition, language, and learning style: the assessment of the ability to make needs known, consciousness, orientation (person, place, and time), expected emotional /behavioral responses, and learning preferences (eg, learning barriers, education  needs)  [x] For the neuromuscular system: a general assessment of gross coordinated movement (eg, balance, gait, locomotion, transfers, and transitions) and motor function  (motor control and motor learning)  [x] For the musculoskeletal system: the assessment of gross symmetry, gross range of motion, gross strength, height, and weight  [] For the integumentary system: the assessment of pliability(texture), presence of scar formation, skin color, and skin integrity  [] For cardiovascular/pulmonary system: the assessment of heart rate, respiratory rate, blood pressure, and edema     Activity limitations:    [] Patient's cognitive status and saf ety concerns          [] Status of current condition      [] Weight bearing restriction  [] Cardiopulmunary Restriction    Participation Restrictions:   [] Goals and goal agreement with the patient     [] Rehab potential (prognosis) and probable outcome      Examination of Body System: (criteria)    [x] 83764 - addressing 1-2 elements    [] 49305 - addressing a total of 3 or more elements     [] 81741 -  Addressing a total of 4 or more elements         Clinical Presentation: (criteria)  Stable - 82851     On examination of body system using standardized tests and measures patient presents with 1-2 elements from any of the following: body structures and functions, activity limitations, and/or participation restrictions.  Leading to a clinical presentation that is considered stable and/or uncomplicated                              Clinical Decision Making  (Eval Complexity):   Low- 59135     Time Tracking:     PT Received On: 07/13/18  PT Start Time: 1423     PT Stop Time: 1450  PT Total Time (min): 27 min with OT    Billable Minutes: Evaluation 10      Gaye Solorio, PT  07/13/2018

## 2018-07-13 NOTE — OP NOTE
Certification of Assistant at Surgery       Surgery Date: 7/13/2018     Participating Surgeons:  Surgeon(s) and Role:     * Baljit Mead MD - Primary    Procedures:  Procedure(s) (LRB):  ARTHROPLASTY-KNEE-BILATERAL (Bilateral)    Assistant Surgeon's Certification of Necessity:  I understand that section 1842 (b) (6) (d) of the Social Security Act generally prohibits Medicare Part B reasonable charge payment for the services of assistants at surgery in teaching hospitals when qualified residents are available to furnish such services. I certify that the services for which payment is claimed were medically necessary, and that no qualified resident was available to perform the services. I further understand that these services are subject to post-payment review by the Medicare carrier.      Christy Melchor PA-C    07/13/2018  11:17 AM

## 2018-07-13 NOTE — PLAN OF CARE
Problem: Occupational Therapy Goal  Goal: Occupational Therapy Goal  Goals to be met by: 8/13/18     Patient will increase functional independence with ADLs by performing:    LE Dressing with Minimal Assistance.  Grooming while standing with Contact Guard Assistance.  Toileting from toilet with Minimal Assistance for hygiene and clothing management.   Supine to sit with Contact Guard Assistance.  Stand pivot transfers with Contact Guard Assistance.  Toilet transfer to toilet with Contact Guard Assistance.  Increased functional strength to WFL for self care skills and functional mobility.  Upper extremity exercise program x10 reps per handout, with independence.    Outcome: Ongoing (interventions implemented as appropriate)  Pt would benefit from cont OT services in order to maximize functional independence. Recommending IPR at d/c at this time. Pt with B TKAs and now requiring assist all skills. Would benefit from IPR in order to return to mod I PLOF

## 2018-07-13 NOTE — ANESTHESIA PROCEDURE NOTES
Peripheral    Patient location during procedure: pre-op   Block not for primary anesthetic.  Reason for block: at surgeon's request and post-op pain management   Post-op Pain Location: Bilateral knees  Start time: 7/13/2018 11:28 AM  Timeout: 7/13/2018 11:27 AM   End time: 7/13/2018 11:45 AM  Surgery related to: Bilateral TKA  Staffing  Anesthesiologist: SANDY CELAYA  Resident/CRNA: CINTHIA BEAR  Performed: resident/CRNA   Preanesthetic Checklist  Completed: patient identified, site marked, surgical consent, pre-op evaluation, timeout performed, IV checked, risks and benefits discussed and monitors and equipment checked  Peripheral Block  Patient position: supine  Prep: ChloraPrep and site prepped and draped  Patient monitoring: heart rate, cardiac monitor, continuous pulse ox, continuous capnometry and frequent blood pressure checks  Block type: adductor canal  Laterality: bilateral  Injection technique: continuous  Needle  Needle type: Tuohy   Needle gauge: 17 G  Needle length: 3.5 in  Needle localization: anatomical landmarks and ultrasound guidance  Catheter type: spring wound  Catheter size: 19 G  Test dose: lidocaine 1.5% with Epi 1-to-200,000 and negative   -ultrasound image captured on disc.  Assessment  Injection assessment: negative aspiration, negative parasthesia and local visualized surrounding nerve  Paresthesia pain: none  Heart rate change: no  Slow fractionated injection: yes  Medications:  Bolus administered: 40 mL of 0.25 ropivacaine  Additional Notes  VSS.  [facility] RN monitoring vitals throughout procedure.  Patient tolerated procedure well.     0.25 % Ropivacaine 20 cc right knee  0.25 % Ropivacaine 20 cc left knee

## 2018-07-13 NOTE — ANESTHESIA POSTPROCEDURE EVALUATION
"Anesthesia Post Evaluation    Patient: Rigoberto Hughes    Procedure(s) Performed: Procedure(s) (LRB):  ARTHROPLASTY-KNEE-BILATERAL (Bilateral)    Final Anesthesia Type: spinal  Patient location during evaluation: PACU  Patient participation: Yes- Able to Participate  Level of consciousness: awake and alert  Post-procedure vital signs: reviewed and stable  Pain management: adequate  Airway patency: patent  PONV status at discharge: No PONV  Anesthetic complications: no      Cardiovascular status: blood pressure returned to baseline and hemodynamically stable  Respiratory status: unassisted  Hydration status: euvolemic  Follow-up not needed.        Visit Vitals  BP (!) 168/74   Pulse 66   Temp 36.8 °C (98.2 °F) (Skin)   Resp 13   Ht 5' 3" (1.6 m)   Wt 68.6 kg (151 lb 3.8 oz)   SpO2 97%   Breastfeeding? No   BMI 26.79 kg/m²       Pain/Dara Score: Pain Assessment Performed: Yes (7/13/2018  3:00 PM)  Presence of Pain: denies (7/13/2018  3:00 PM)  Pain Rating Prior to Med Admin: 10 (7/13/2018  2:08 PM)  Pain Rating Post Med Admin: 2 (7/13/2018  3:00 PM)  Dara Score: 10 (7/13/2018 12:54 PM)  Modified Dara Score: 20 (7/13/2018 12:54 PM)      "

## 2018-07-13 NOTE — TRANSFER OF CARE
"Anesthesia Transfer of Care Note    Patient: Rigoberto Hughes    Procedure(s) Performed: Procedure(s) (LRB):  ARTHROPLASTY-KNEE-BILATERAL (Bilateral)    Anesthesia Type: CSE and MAC    Transport from OR: Transported from OR on 6-10 L/min O2 by face mask with adequate spontaneous ventilation    Post pain: adequate analgesia    Post assessment: no apparent anesthetic complications    Post vital signs: stable    Level of consciousness: awake, alert and oriented    Nausea/Vomiting: no nausea/vomiting    Complications: none    Transfer of care protocol was followed      Last vitals:   Visit Vitals  /86 (BP Location: Right arm, Patient Position: Sitting)   Pulse 69   Temp 37 °C (98.6 °F) (Oral)   Resp 17   Ht 5' 3" (1.6 m)   Wt 68.6 kg (151 lb 3.8 oz)   SpO2 95%   Breastfeeding? No   BMI 26.79 kg/m²     "

## 2018-07-14 LAB
ANION GAP SERPL CALC-SCNC: 5 MMOL/L
BUN SERPL-MCNC: 19 MG/DL
CALCIUM SERPL-MCNC: 8.9 MG/DL
CHLORIDE SERPL-SCNC: 107 MMOL/L
CO2 SERPL-SCNC: 27 MMOL/L
CREAT SERPL-MCNC: 1 MG/DL
EST. GFR  (AFRICAN AMERICAN): >60 ML/MIN/1.73 M^2
EST. GFR  (NON AFRICAN AMERICAN): 58 ML/MIN/1.73 M^2
GLUCOSE SERPL-MCNC: 128 MG/DL
HCT VFR BLD AUTO: 31.1 %
HGB BLD-MCNC: 9.8 G/DL
POTASSIUM SERPL-SCNC: 4.1 MMOL/L
SODIUM SERPL-SCNC: 139 MMOL/L

## 2018-07-14 PROCEDURE — 11000001 HC ACUTE MED/SURG PRIVATE ROOM

## 2018-07-14 PROCEDURE — 94761 N-INVAS EAR/PLS OXIMETRY MLT: CPT

## 2018-07-14 PROCEDURE — 25000003 PHARM REV CODE 250: Performed by: ORTHOPAEDIC SURGERY

## 2018-07-14 PROCEDURE — 85018 HEMOGLOBIN: CPT

## 2018-07-14 PROCEDURE — 63600175 PHARM REV CODE 636 W HCPCS: Performed by: ORTHOPAEDIC SURGERY

## 2018-07-14 PROCEDURE — 36415 COLL VENOUS BLD VENIPUNCTURE: CPT

## 2018-07-14 PROCEDURE — 85014 HEMATOCRIT: CPT

## 2018-07-14 PROCEDURE — 97116 GAIT TRAINING THERAPY: CPT

## 2018-07-14 PROCEDURE — 97530 THERAPEUTIC ACTIVITIES: CPT

## 2018-07-14 PROCEDURE — 97110 THERAPEUTIC EXERCISES: CPT

## 2018-07-14 PROCEDURE — 80048 BASIC METABOLIC PNL TOTAL CA: CPT

## 2018-07-14 RX ORDER — KETOROLAC TROMETHAMINE 30 MG/ML
15 INJECTION, SOLUTION INTRAMUSCULAR; INTRAVENOUS EVERY 6 HOURS
Status: DISCONTINUED | OUTPATIENT
Start: 2018-07-14 | End: 2018-07-15 | Stop reason: HOSPADM

## 2018-07-14 RX ADMIN — ASPIRIN 81 MG: 81 TABLET, COATED ORAL at 09:07

## 2018-07-14 RX ADMIN — OXYCODONE HYDROCHLORIDE 10 MG: 5 TABLET ORAL at 06:07

## 2018-07-14 RX ADMIN — HYDROMORPHONE HYDROCHLORIDE 0.2 MG: 2 INJECTION, SOLUTION INTRAMUSCULAR; INTRAVENOUS; SUBCUTANEOUS at 06:07

## 2018-07-14 RX ADMIN — FAMOTIDINE 20 MG: 20 TABLET ORAL at 09:07

## 2018-07-14 RX ADMIN — SENNOSIDES AND DOCUSATE SODIUM 1 TABLET: 8.6; 5 TABLET ORAL at 09:07

## 2018-07-14 RX ADMIN — ACETAMINOPHEN 1000 MG: 500 TABLET, FILM COATED ORAL at 03:07

## 2018-07-14 RX ADMIN — SIMVASTATIN 20 MG: 20 TABLET, FILM COATED ORAL at 09:07

## 2018-07-14 RX ADMIN — CARVEDILOL 3.12 MG: 3.12 TABLET, FILM COATED ORAL at 09:07

## 2018-07-14 RX ADMIN — OXYCODONE HYDROCHLORIDE 10 MG: 5 TABLET ORAL at 09:07

## 2018-07-14 RX ADMIN — POLYETHYLENE GLYCOL 3350 17 G: 17 POWDER, FOR SOLUTION ORAL at 09:07

## 2018-07-14 RX ADMIN — CEFAZOLIN SODIUM 2 G: 2 SOLUTION INTRAVENOUS at 03:07

## 2018-07-14 RX ADMIN — KETOROLAC TROMETHAMINE 15 MG: 30 INJECTION, SOLUTION INTRAMUSCULAR at 05:07

## 2018-07-14 RX ADMIN — KETOROLAC TROMETHAMINE 15 MG: 30 INJECTION, SOLUTION INTRAMUSCULAR at 12:07

## 2018-07-14 RX ADMIN — ACETAMINOPHEN 1000 MG: 500 TABLET, FILM COATED ORAL at 09:07

## 2018-07-14 RX ADMIN — LOSARTAN POTASSIUM 100 MG: 50 TABLET, FILM COATED ORAL at 09:07

## 2018-07-14 NOTE — PROGRESS NOTES
"[facility]  Orthopedics  Progress Note    Patient Name: Rigoberto Hughes  MRN: [id number]  Admission Date: 7/13/2018  Hospital Length of Stay: 1 days  Attending Provider: Baljit Mead MD  Primary Care Provider: Austin Coleman MD  Follow-up For: Procedure(s) (LRB):  ARTHROPLASTY-KNEE-BILATERAL (Bilateral)    Post-Operative Day: 1 Day Post-Op  Subjective:     Principal Problem:Primary osteoarthritis of both knees    Principal Orthopedic Problem: Same    Interval History: 24 Hr Interval Hx:   The patient states that pain is adequately controlled.   Feels that ace wraps are tight    Review of patient's allergies indicates:  No Known Allergies    Current Facility-Administered Medications   Medication    0.9%  NaCl infusion    acetaminophen tablet 1,000 mg    aspirin EC tablet 81 mg    bisacodyl suppository 10 mg    carvedilol tablet 3.125 mg    famotidine tablet 20 mg    hydromorphone (PF) injection 0.2 mg    lactulose 20 gram/30 mL solution Soln 20 g    losartan tablet 100 mg    ondansetron disintegrating tablet 4 mg    ondansetron injection 4 mg    oxyCODONE immediate release tablet 10 mg    oxyCODONE immediate release tablet 5 mg    polyethylene glycol packet 17 g    ropivacaine 0.2% on-Q c-bloc 550 ml     ropivacaine 0.2% ON-Q C-BLOC 550 ML    senna-docusate 8.6-50 mg per tablet 1 tablet    simvastatin tablet 20 mg    sodium chloride 0.9% flush 5 mL     Objective:     Vital Signs (Most Recent):  Temp: 98 °F (36.7 °C) (07/14/18 0717)  Pulse: 91 (07/14/18 0856)  Resp: 19 (07/14/18 0856)  BP: (!) 172/74 (07/14/18 0717)  SpO2: (!) 92 % (07/14/18 0856) Vital Signs (24h Range):  Temp:  [97.5 °F (36.4 °C)-98.2 °F (36.8 °C)] 98 °F (36.7 °C)  Pulse:  [] 91  Resp:  [11-19] 19  SpO2:  [92 %-100 %] 92 %  BP: (142-192)/(63-82) 172/74     Weight: 68.6 kg (151 lb 3.8 oz)  Height: 5' 3" (160 cm)  Body mass index is 26.79 kg/m².      Intake/Output Summary (Last 24 hours) at 07/14/18 " 1051  Last data filed at 07/14/18 0500   Gross per 24 hour   Intake              600 ml   Output              660 ml   Net              -60 ml       Ortho/SPM Exam         Vital Sign Ranges:        Temp:  [97.5 °F (36.4 °C)-98.2 °F (36.8 °C)] 98 °F (36.7 °C)  Pulse:  [] 91  Resp:  [11-19] 19  SpO2:  [92 %-100 %] 92 %  BP: (142-192)/(63-82) 172/74           Ort Exam:    Appears alert and comfortable.  There is a small amount of blood the surgical dressings.  Distal neurocirculatory status is intact on the operated extremities.                  Significant Labs: All pertinent labs within the past 24 hours have been reviewed.    Significant Imaging: None    Assessment/Plan:     Active Diagnoses:    Diagnosis Date Noted POA    PRINCIPAL PROBLEM:  Primary osteoarthritis of both knees [M17.0] 07/13/2018 Yes      Problems Resolved During this Admission:    Diagnosis Date Noted Date Resolved POA     Normal postop day one.  Physical therapy.  Discontinue intravenous fluid and intravenous narcotics.        Signature:        Baljit Mead MD        Electronic Signature        Baljit Mead MD  Orthopedics  [facility]

## 2018-07-14 NOTE — PLAN OF CARE
Problem: Physical Therapy Goal  Goal: Physical Therapy Goal  Goals to be met by: 18     Patient will increase functional independence with mobility by performin. Supine to sit with MInimal Assistance  2. Sit to supine with MInimal Assistance  3. Sit to stand transfer with Contact Guard Assistance  4. Bed to chair transfer with Contact Guard Assistance using Rolling Walker  5. Gait  x 50 feet with Contact Guard Assistance using Rolling Walker.   6. Lower extremity exercise program x 10 reps per handout, with assistance as needed MET 18     Outcome: Ongoing (interventions implemented as appropriate)  Goal 6 met

## 2018-07-14 NOTE — NURSING
Garcia discontinued . 10 cc normal saline balloon deflated. Patient tolerated well. Will continue to monitor for self void.

## 2018-07-14 NOTE — PLAN OF CARE
Pt aaox4, c/o pain with relief from meds.  On regular diet, tolerating well.  Ganesh knee drsg CDI.  Up with assist and with works with physical therapy.  On IVF.  VSS.

## 2018-07-14 NOTE — PLAN OF CARE
TN spoke with patient and son. Patient speaks minimal english, and request son be primary contact. Declined  at this time.       patient post op bilateral TKA.  PT/OT on case, reccs discussed with SON. SOn rather patient go home with Home health and BSC. SOn informed on Rehab locations and benefits. Family will monitor patient status on Monday and decide on d/c plan.    Family will be available 24/7 with patient at home.      Future Appointments  Date Time Provider Department Center   7/27/2018 3:15 PM Baljit Mead MD [facility]           07/14/18 1827   Discharge Assessment   Assessment Type Discharge Planning Assessment   Confirmed/corrected address and phone number on facesheet? Yes   Assessment information obtained from? Patient   Communicated expected length of stay with patient/caregiver yes   Prior to hospitilization cognitive status: Alert/Oriented   Prior to hospitalization functional status: Independent;Assistive Equipment   Current cognitive status: Alert/Oriented   Current Functional Status: Assistive Equipment;Independent   Lives With child(fantasma), adult;grandchild(fantasma)   Able to Return to Prior Arrangements unable to determine at this time (comments)   Is patient able to care for self after discharge? Unable to determine at this time (comments)   Who are your caregiver(s) and their phone number(s)? Sujit , hortensia    Patient's perception of discharge disposition home or selfcare;home health   Readmission Within The Last 30 Days no previous admission in last 30 days   Patient currently being followed by outpatient case management? No   Patient currently receives any other outside agency services? No   Equipment Currently Used at Home walker, rolling;bath bench   Do you have any problems affording any of your prescribed medications? No   Is the patient taking medications as prescribed? yes   Does the patient have transportation home? Yes   Transportation Available family or friend  will provide   Does the patient receive services at the [facility]? No   Discharge Plan A Home with family   Discharge Plan B Home with family   Patient/Family In Agreement With Plan yes   Does the patient have transportation to healthcare appointments? Yes

## 2018-07-14 NOTE — PT/OT/SLP PROGRESS
Physical Therapy Treatment    Patient Name:  Rigoberto Hughes   MRN:  [id number]    Recommendations:     Discharge Recommendations:  rehabilitation facility   Discharge Equipment Recommendations:     Barriers to discharge: decreased mobility,strength,endurance and ROM    Assessment:     Rigoberto Hughes is a 67 y.o. female admitted with a medical diagnosis of Primary osteoarthritis of both knees.  She presents with the following impairments/functional limitations:  weakness, impaired endurance, impaired functional mobilty, gait instability, impaired balance, decreased lower extremity function, pain, decreased ROM, impaired coordination, impaired skin pt requires Mod/Max A with bed mobility and assistance with all mobility,pt will benefit from rehab surfaces to increase functional independence,pt with increased pain in standing with good participation.    Rehab Prognosis:  Good; patient would benefit from acute skilled PT services to address these deficits and reach maximum level of function.      Recent Surgery: Procedure(s) (LRB):  ARTHROPLASTY-KNEE-BILATERAL (Bilateral) 1 Day Post-Op    Plan:     During this hospitalization, patient to be seen BID to address the above listed problems via gait training, therapeutic activities, therapeutic exercises  · Plan of Care Expires:  08/13/18   Plan of Care Reviewed with: patient    Subjective     Communicated with nsg prior to session.  Patient found supine upon PT entry to room, agreeable to treatment.      Chief Complaint: n/a  Patient comments/goals: pt's son with questions/concerns about HH vs rehab services  Pain/Comfort:  · Pain Rating 1:  (no rating)  · Location - Side 1: Bilateral  · Location - Orientation 1: generalized  · Location 1: knee  · Pain Addressed 1: Reposition, Distraction, Nurse notified    Patients cultural, spiritual, Restorationism conflicts given the current situation: none reported    Objective:     Patient found with: peripheral IV (Q balls)     General  Precautions: Standard, fall   Orthopedic Precautions:RLE weight bearing as tolerated, LLE weight bearing as tolerated   Braces: N/A     Functional Mobility:  · Bed Mobility:     · Supine to Sit: maximal assistance  · Sit to Supine: maximal assistance and of 2 persons  · Transfers:     · Sit to Stand:  moderate assistance and of 2 persons with rolling walker  · Gait: amb ~4' up/back with RW and Min/Mod A X 2 for safety  · Balance: fair sitting balance      AM-PAC 6 CLICK MOBILITY  Turning over in bed (including adjusting bedclothes, sheets and blankets)?: 2  Sitting down on and standing up from a chair with arms (e.g., wheelchair, bedside commode, etc.): 2  Moving from lying on back to sitting on the side of the bed?: 2  Moving to and from a bed to a chair (including a wheelchair)?: 2  Need to walk in hospital room?: 2  Climbing 3-5 steps with a railing?: 1  Basic Mobility Total Score: 11       Therapeutic Activities and Exercises: le supine ex's X 10-12 reps inc: ap,qs,hs,abd/add,slr with Min A,pt's son assisted with rx,pt sat EOB ~10 mins with S,increased time and rest periods throughout rx       Patient left supine with all lines intact, call button in reach, nsg notified and son present..    GOALS: see general POC   Physical Therapy Goals        Problem: Physical Therapy Goal    Goal Priority Disciplines Outcome Goal Variances Interventions   Physical Therapy Goal     PT/OT, PT Ongoing (interventions implemented as appropriate)     Description:  Goals to be met by: 18     Patient will increase functional independence with mobility by performin. Supine to sit with MInimal Assistance  2. Sit to supine with MInimal Assistance  3. Sit to stand transfer with Contact Guard Assistance  4. Bed to chair transfer with Contact Guard Assistance using Rolling Walker  5. Gait  x 50 feet with Contact Guard Assistance using Rolling Walker.   6. Lower extremity exercise program x 10 reps per handout, with assistance  as needed MET 7/14/18                       Time Tracking:     PT Received On: 07/14/18  PT Start Time: 0908     PT Stop Time: 0950  PT Total Time (min): 42 min     Billable Minutes: Gait Training 18, Therapeutic Activity 12 and Therapeutic Exercise 12    Treatment Type: Treatment  PT/PTA: PTA     PTA Visit Number: 1     Varun Pollard, PTA  07/14/2018

## 2018-07-15 VITALS
BODY MASS INDEX: 30.39 KG/M2 | WEIGHT: 171.5 LBS | SYSTOLIC BLOOD PRESSURE: 144 MMHG | RESPIRATION RATE: 18 BRPM | DIASTOLIC BLOOD PRESSURE: 68 MMHG | OXYGEN SATURATION: 93 % | TEMPERATURE: 99 F | HEIGHT: 63 IN | HEART RATE: 79 BPM

## 2018-07-15 PROCEDURE — 63600175 PHARM REV CODE 636 W HCPCS: Performed by: ORTHOPAEDIC SURGERY

## 2018-07-15 PROCEDURE — 97116 GAIT TRAINING THERAPY: CPT

## 2018-07-15 PROCEDURE — 25000003 PHARM REV CODE 250: Performed by: ORTHOPAEDIC SURGERY

## 2018-07-15 PROCEDURE — 97110 THERAPEUTIC EXERCISES: CPT

## 2018-07-15 PROCEDURE — 94761 N-INVAS EAR/PLS OXIMETRY MLT: CPT

## 2018-07-15 RX ORDER — HYDROCODONE BITARTRATE AND ACETAMINOPHEN 10; 325 MG/1; MG/1
1 TABLET ORAL EVERY 4 HOURS PRN
Qty: 60 TABLET | Refills: 0 | Status: SHIPPED | OUTPATIENT
Start: 2018-07-15 | End: 2020-07-13

## 2018-07-15 RX ADMIN — ASPIRIN 81 MG: 81 TABLET, COATED ORAL at 09:07

## 2018-07-15 RX ADMIN — KETOROLAC TROMETHAMINE 15 MG: 30 INJECTION, SOLUTION INTRAMUSCULAR at 11:07

## 2018-07-15 RX ADMIN — KETOROLAC TROMETHAMINE 15 MG: 30 INJECTION, SOLUTION INTRAMUSCULAR at 05:07

## 2018-07-15 RX ADMIN — FAMOTIDINE 20 MG: 20 TABLET ORAL at 09:07

## 2018-07-15 RX ADMIN — LOSARTAN POTASSIUM 100 MG: 50 TABLET, FILM COATED ORAL at 09:07

## 2018-07-15 RX ADMIN — CARVEDILOL 3.12 MG: 3.12 TABLET, FILM COATED ORAL at 09:07

## 2018-07-15 RX ADMIN — POLYETHYLENE GLYCOL 3350 17 G: 17 POWDER, FOR SOLUTION ORAL at 09:07

## 2018-07-15 RX ADMIN — SENNOSIDES AND DOCUSATE SODIUM 1 TABLET: 8.6; 5 TABLET ORAL at 09:07

## 2018-07-15 NOTE — PROGRESS NOTES
"[facility]  Orthopedics  Progress Note    Patient Name: Rigoberto Hughes  MRN: [id number]  Admission Date: 7/13/2018  Hospital Length of Stay: 2 days  Attending Provider: Baljit Mead MD  Primary Care Provider: Austin Coleman MD  Follow-up For: Procedure(s) (LRB):  ARTHROPLASTY-KNEE-BILATERAL (Bilateral)    Post-Operative Day: 2 Days Post-Op  Subjective:     Principal Problem:Primary osteoarthritis of both knees    Principal Orthopedic Problem: same    Interval History:     24 Hr Interval Hx:   The patient states that pain is adequately controlled. No specific complaints reported.        Review of patient's allergies indicates:  No Known Allergies    Current Facility-Administered Medications   Medication    acetaminophen tablet 1,000 mg    aspirin EC tablet 81 mg    bisacodyl suppository 10 mg    carvedilol tablet 3.125 mg    famotidine tablet 20 mg    ketorolac injection 15 mg    lactulose 20 gram/30 mL solution Soln 20 g    losartan tablet 100 mg    ondansetron disintegrating tablet 4 mg    ondansetron injection 4 mg    oxyCODONE immediate release tablet 10 mg    oxyCODONE immediate release tablet 5 mg    polyethylene glycol packet 17 g    ropivacaine 0.2% on-Q c-bloc 550 ml     ropivacaine 0.2% ON-Q C-BLOC 550 ML    senna-docusate 8.6-50 mg per tablet 1 tablet    simvastatin tablet 20 mg    sodium chloride 0.9% flush 5 mL     Objective:     Vital Signs (Most Recent):  Temp: 98.3 °F (36.8 °C) (07/15/18 0744)  Pulse: 79 (07/15/18 0744)  Resp: 18 (07/15/18 0744)  BP: (!) 148/76 (07/15/18 0744)  SpO2: (!) 93 % (07/15/18 0801) Vital Signs (24h Range):  Temp:  [97.5 °F (36.4 °C)-98.8 °F (37.1 °C)] 98.3 °F (36.8 °C)  Pulse:  [75-94] 79  Resp:  [17-20] 18  SpO2:  [92 %-93 %] 93 %  BP: (115-166)/(59-80) 148/76     Weight: 77.8 kg (171 lb 8.3 oz)  Height: 5' 3" (160 cm)  Body mass index is 30.38 kg/m².    No intake or output data in the 24 hours ending 07/15/18 1134    Ortho/SPM Exam "         Vital Sign Ranges:        Temp:  [97.5 °F (36.4 °C)-98.8 °F (37.1 °C)] 98.3 °F (36.8 °C)  Pulse:  [75-94] 79  Resp:  [17-20] 18  SpO2:  [92 %-93 %] 93 %  BP: (115-166)/(59-80) 148/76           Ort Exam:    Appears alert and comfortable.  There is a small amount of blood the surgical dressings                Significant Labs: All pertinent labs within the past 24 hours have been reviewed.  None    Significant Imaging: None    Assessment/Plan:     Active Diagnoses:    Diagnosis Date Noted POA    PRINCIPAL PROBLEM:  Primary osteoarthritis of both knees [M17.0] 07/13/2018 Yes      Problems Resolved During this Admission:    Diagnosis Date Noted Date Resolved POA       Normal postop progress to this point.  Continue physical therapy.    Stable for discharge home.      Signature:        Baljit Mead MD        Electronic Signature          Baljit Mead MD  Orthopedics  [facility]

## 2018-07-15 NOTE — DISCHARGE SUMMARY
DISCHARGE SUMMARY      Date and time of Admission: 7/13/2018  5:26 AM    Date of Discharge:7/15/2018    Discharge Diagnoses:    Active Hospital Problems    Diagnosis  POA    *Primary osteoarthritis of both knees [M17.0]  Yes      Resolved Hospital Problems    Diagnosis Date Resolved POA   No resolved problems to display.       Discharge Medications:       Medication List      CHANGE how you take these medications    HYDROcodone-acetaminophen  mg per tablet  Commonly known as:  NORCO  Take 1 tablet by mouth every 4 (four) hours as needed for Pain.  What changed:  when to take this        CONTINUE taking these medications    aspirin 81 MG EC tablet  Commonly known as:  ECOTRIN     b complex vitamins tablet     carvedilol 3.125 MG tablet  Commonly known as:  COREG  Take 1 tablet (3.125 mg total) by mouth 2 (two) times daily.     CENTRUM SILVER ORAL     losartan 100 MG tablet  Commonly known as:  COZAAR     simvastatin 20 MG tablet  Commonly known as:  ZOCOR  Take 1 tablet (20 mg total) by mouth every evening.           Where to Get Your Medications      You can get these medications from any pharmacy    Bring a paper prescription for each of these medications  · HYDROcodone-acetaminophen  mg per tablet         Follow-up (including scheduled tests):    History of Present Illness: See H&P    Past Medical History:    Past Medical History:   Diagnosis Date    Arthritis     Hyperlipidemia     Hypertension        Allergies: Patient has no known allergies.    Hospital Course:    The patient underwent surgery on the day of admission. The procedure was uneventful and the patient tolerated well. Post operatively the patient was hemodynamically stable and made appropriate progress in therapy. There were no evident acute postoperative complications.    Procedures: Bilateral TKA    Discharge Physical Exam:    See progress note    Condition: Improved    Activity: WBAT    Diet: Resume preadmission diet    Disposition:  Home with services

## 2018-07-15 NOTE — PLAN OF CARE
Problem: Physical Therapy Goal  Goal: Physical Therapy Goal  Goals to be met by: 18     Patient will increase functional independence with mobility by performin. Supine to sit with MInimal Assistance Met 7/15/18  2. Sit to supine with MInimal Assistance  3. Sit to stand transfer with Contact Guard Assistance  4. Bed to chair transfer with Contact Guard Assistance using Rolling Walker  5. Gait  x 50 feet with Contact Guard Assistance using Rolling Walker.   6. Lower extremity exercise program x 10 reps per handout, with assistance as needed MET 18      Outcome: Ongoing (interventions implemented as appropriate)  Continue working toward goals.

## 2018-07-15 NOTE — ANESTHESIA POST-OP PAIN MANAGEMENT
Acute Pain Service Progress Note    Rigoberto Hughes is a 67 y.o., female, [id number].    Surgery:  Bilateral Knees    Post Op Day #: 1    Catheter type: perineural  adductor canals    Infusion type: Ropivacaine 0.2%  8 basal    Problem List:    Active Hospital Problems    Diagnosis  POA    *Primary osteoarthritis of both knees [M17.0]  Yes      Resolved Hospital Problems    Diagnosis Date Resolved POA   No resolved problems to display.       Subjective:     General appearance of alert, oriented, no complaints   Pain with rest: 3    Numbers   Pain with movement: 8    Numbers   Side Effects    1. Pruritis No    2. Nausea No    3. Motor Blockade No, 0=Ability to raise lower extremities off bed    4. Sedation No, 1=awake and alert    Objective:     Catheter site clean, dry, intact        Vitals   Vitals:    07/14/18 1627   BP:    Pulse: 77   Resp: 17   Temp:         Labs    Admission on 07/13/2018   Component Date Value Ref Range Status    Group & Rh 07/13/2018 B POS   Final    Indirect Azul 07/13/2018 NEG   Final    Sodium 07/14/2018 139  136 - 145 mmol/L Final    Potassium 07/14/2018 4.1  3.5 - 5.1 mmol/L Final    Chloride 07/14/2018 107  95 - 110 mmol/L Final    CO2 07/14/2018 27  23 - 29 mmol/L Final    Glucose 07/14/2018 128* 70 - 110 mg/dL Final    BUN, Bld 07/14/2018 19  8 - 23 mg/dL Final    Creatinine 07/14/2018 1.0  0.5 - 1.4 mg/dL Final    Calcium 07/14/2018 8.9  8.7 - 10.5 mg/dL Final    Anion Gap 07/14/2018 5* 8 - 16 mmol/L Final    eGFR if  07/14/2018 >60  >60 mL/min/1.73 m^2 Final    eGFR if non African American 07/14/2018 58* >60 mL/min/1.73 m^2 Final    Hemoglobin 07/14/2018 9.8* 12.0 - 16.0 g/dL Final    Hematocrit 07/14/2018 31.1* 37.0 - 48.5 % Final        Meds   Current Facility-Administered Medications   Medication Dose Route Frequency Provider Last Rate Last Dose    acetaminophen tablet 1,000 mg  1,000 mg Oral TID Christy Melchor PA-C   1,000 mg at 07/14/18 6591     aspirin EC tablet 81 mg  81 mg Oral Daily Christy Melchor PA-C   81 mg at 07/14/18 0952    bisacodyl suppository 10 mg  10 mg Rectal Daily PRN Christy Melchor PA-C        carvedilol tablet 3.125 mg  3.125 mg Oral BID Christy Melchor PA-C   3.125 mg at 07/14/18 0953    famotidine tablet 20 mg  20 mg Oral BID Christy Melchor PA-C   20 mg at 07/14/18 0952    ketorolac injection 15 mg  15 mg Intravenous Q6H Baljit Mead MD   15 mg at 07/14/18 1748    lactulose 20 gram/30 mL solution Soln 20 g  20 g Oral Q6H PRN Christy Melchor PA-C        losartan tablet 100 mg  100 mg Oral Daily Baljit Mead MD   100 mg at 07/14/18 0952    ondansetron disintegrating tablet 4 mg  4 mg Oral Q6H PRN Christy Melchor PA-C        ondansetron injection 4 mg  4 mg Intravenous Q6H PRN Christy Melchor PA-C        oxyCODONE immediate release tablet 10 mg  10 mg Oral Q3H PRN Christy Melchor PA-C   10 mg at 07/14/18 0958    oxyCODONE immediate release tablet 5 mg  5 mg Oral Q3H PRN Christy Melchor PA-C        polyethylene glycol packet 17 g  17 g Oral Daily Christy Melchor PA-C   17 g at 07/14/18 0952    ropivacaine 0.2% on-Q c-bloc 550 ml    Perineural Continuous Jeannette Benitez MD        ropivacaine 0.2% ON-Q C-BLOC 550 ML   Perineural Continuous Jeannette Benitez MD        senna-docusate 8.6-50 mg per tablet 1 tablet  1 tablet Oral BID Christy Melchor PA-C   1 tablet at 07/14/18 0952    simvastatin tablet 20 mg  20 mg Oral QHS Christy Melchor PA-C   20 mg at 07/13/18 2047    sodium chloride 0.9% flush 5 mL  5 mL Intravenous PRN Christy Melchor PA-C               Assessment:     Pain control adequate    Plan:     Patient doing well, continue present treatment.    Evaluator Jass Brar

## 2018-07-15 NOTE — DISCHARGE INSTRUCTIONS
GENERAL DISCHARGE INSTRUCTIONS:        FOLLOW UP APPOINTMENT:  Call your surgeon's office to schedule your post operative appointment, if one has not already been scheduled.     WEIGHTBEARING:  You may bear as much weight as you can tolerate on your operative leg.  Continue to use a walking device until d/c'd by your physical therapist.    PAIN MANAGEMENT:  Take your pain medication as prescribed.  Ween yourself off narcotic pain medication slowly, supplementing with acetominophen (Tylenol).  Do not exceed 3000 mg of Tylenol per day.    SURGICAL DRESSING: Do not change dressing unless if  falls off or your nurse feels that it is saturated. Replace with similar dressing, only if absolutely necessary.    DRIVING:  Do not drive until you have your follow up appointment with your surgeon.    SHOWERING:  You can shower as long as your dressing is intact and your physical therapist has instructed you on how to safely get in and out of your shower.    TO PREVENT BLOOD CLOTS: continue to take aspirin (or other medication) as instructed above.  Also continue to walk frequently throughout the day.    TO PREVENT CONSTIPATION:  continue to take stool softeners regularly for as long as you are on narcotic pain medication (oxycodone/hydrocodone). If you do not have normal bowel movement for 1-2 days, purchase an over the counter laxative, such as Milk of Magnesia, and follow the instructions on the label. Call your doctor for severe abdominal pain, vomiting or diarrhea.    To PREVENT SWELLING:  This is normal for at least 2 months after surgery. Spend time each day with your leg elevated on several pillows. Use ice as needed. ROBERT stockings are helpful for swelling, but MAY be removed, if desired.    NOTIFY YOUR SURGEON IF: Unrelenting pain that does not improve, even after taking prescribed pain medication. Increasing drainage from the wound.

## 2018-07-15 NOTE — PLAN OF CARE
Pt to d/c toDAY TO HOME with family. Patient has HH orders and [facility] for home use orders. [facility] has accepted patient and will begin tomorrow. Patient request female staff, message sent. [place] received okay from Harjeet with [facility] to pull [facility] for home use.    [facility] delivered to bedside, delivery ticket signed.        Future Appointments  Date Time Provider Department Center   7/27/2018 3:15 PM Baljit Mead MD [facility] ORTH[facility]           07/15/18 1402   Final Note   Assessment Type Final Discharge Note   Discharge Disposition Home-Health   What phone number can be called within the next 1-3 days to see how you are doing after discharge? [phone]   Hospital Follow Up  Appt(s) scheduled? Yes   Discharge plans and expectations educations in teach back method with documentation complete? Yes   Right Care Referral Info   Post Acute Recommendation Home-care

## 2018-07-15 NOTE — PLAN OF CARE
Problem: Patient Care Overview  Goal: Plan of Care Review  Outcome: Ongoing (interventions implemented as appropriate)  Patient resting in bed, AAOx4. Family at the bedside. Medications administered as ordered. No complaints of pain or discomfort. Patient asleep for majority of shift. Small amount of drainage noted of both bilateral knee dressings. Ropivacaine pain balls in place bilaterally. Translation services offered again free of charge, patient prefers her son to translate. Encouraged to call with needs or concerns. Will continue to monitor.

## 2018-07-15 NOTE — PT/OT/SLP PROGRESS
Physical Therapy Treatment    Patient Name:  Rigoberto Hughes   MRN:  [id number]    Recommendations:     Discharge Recommendations:  rehabilitation facility   Discharge Equipment Recommendations: bedside commode   Barriers to discharge: Decreased caregiver support    Assessment:     Rigoberto Hughes is a 67 y.o. female admitted with a medical diagnosis of Primary osteoarthritis of both knees.  She presents with the following impairments/functional limitations:  weakness, impaired endurance, impaired functional mobilty, impaired self care skills, gait instability, impaired balance, decreased lower extremity function, pain, decreased ROM.  Pt was able to ambulate further today (20ft) with Rw and Lake/CGA with decreased may, decreased step length, decreased knee flexion during swing phase, decreased strength and endurance. Pt is unsteady while ambulating, but no LOB was noted.  Pt demonstrated good participate even though she was experiencing increased pain.    Pt would continue to benefit from P.T. To address impairments listed above.    Rehab Prognosis:  good; patient would benefit from acute skilled PT services to address these deficits and reach maximum level of function.      Recent Surgery: Procedure(s) (LRB):  ARTHROPLASTY-KNEE-BILATERAL (Bilateral) 2 Days Post-Op    Plan:     During this hospitalization, patient to be seen BID to address the above listed problems via gait training, therapeutic activities, therapeutic exercises  · Plan of Care Expires:  08/13/18   Plan of Care Reviewed with: patient, son    Subjective     Communicated with RN (Nguyen) prior to session.  Patient found supine upon PT entry to room, agreeable to treatment.      Patient comments/goals: Pt agreed to tx.  Pt's son declined  line and stated he would translate for PTA.  Pain/Comfort:  · Pain Rating 1: 6/10  · Location - Side 1: Bilateral  · Location - Orientation 1: generalized  · Location 1: knee  · Pain Addressed 1:  "Pre-medicate for activity, Reposition, Cessation of Activity, Nurse notified  · Pain Rating Post-Intervention 1: 7/10    Patients cultural, spiritual, Sikh conflicts given the current situation: none reported    Objective:     Patient found with:       General Precautions: Standard, fall   Orthopedic Precautions:RLE weight bearing as tolerated, LLE weight bearing as tolerated   Braces:       Functional Mobility:  · Bed Mobility:     · Scooting: minimum assistance and To EOB for BLE support while pt scooted.  · Supine to Sit: minimum assistance  · Sit to Supine: moderate assistance  · Transfers:     · Sit to Stand:  moderate assistance and maximal assistance with rolling walker  · Gait: 20ft with Rw and [place]/CGA with continual vc's to stay closer to RW for increased stability and Min A for RW managment on turns and to assist with keeping RW closer to pt when ambulating.  · Balance: sitting good, standing fair with RW, gait fair/fair- with rW.      AM-PAC 6 CLICK MOBILITY  Turning over in bed (including adjusting bedclothes, sheets and blankets)?: 3  Sitting down on and standing up from a chair with arms (e.g., wheelchair, bedside commode, etc.): 2  Moving from lying on back to sitting on the side of the bed?: 2  Moving to and from a bed to a chair (including a wheelchair)?: 3  Need to walk in hospital room?: 3  Climbing 3-5 steps with a railing?: 1  Basic Mobility Total Score: 14       Therapeutic Activities and Exercises:   Supine BLE therex: AP, heelslides, QS, SLRs x 10 reps with Lake  Seated BLE therex:  B knee flexion/EXT with pt's foot on towel to assist with  Foot sliding forward and backward on floor for flex/ext.  Pt required Lake (10 reps)  LAQs with occasional assistance for increased ROM x 10 reps.    Patient left supine with all lines intact, call button in reach, bed alarm on, nsg notified and pt"s son present..    GOALS:    Physical Therapy Goals        Problem: Physical Therapy Goal    Goal " Priority Disciplines Outcome Goal Variances Interventions   Physical Therapy Goal     PT/OT, PT Ongoing (interventions implemented as appropriate)     Description:  Goals to be met by: 18     Patient will increase functional independence with mobility by performin. Supine to sit with MInimal Assistance Met 7/15/18  2. Sit to supine with MInimal Assistance  3. Sit to stand transfer with Contact Guard Assistance  4. Bed to chair transfer with Contact Guard Assistance using Rolling Walker  5. Gait  x 50 feet with Contact Guard Assistance using Rolling Walker.   6. Lower extremity exercise program x 10 reps per handout, with assistance as needed MET 18                        Time Tracking:     PT Received On: 07/15/18  PT Start Time: 1154     PT Stop Time: 1222  PT Total Time (min): 28 min     Billable Minutes: Gait Training 11 and Therapeutic Exercise 17    Treatment Type: Treatment  PT/PTA: PTA     PTA Visit Number: 2     Mary Mondragon PTA  07/15/2018

## 2018-07-15 NOTE — ANESTHESIA POST-OP PAIN MANAGEMENT
Acute Pain Service Progress Note    Rigoberto Hughes is a 67 y.o., female, [id number].    Surgery:  tka b/l    Post Op Day #: 2    Catheter type: perineural  saphenous     Infusion type: Ropivacaine 0.2%  8 basal    Problem List:    Active Hospital Problems    Diagnosis  POA    *Primary osteoarthritis of both knees [M17.0]  Yes      Resolved Hospital Problems    Diagnosis Date Resolved POA   No resolved problems to display.       Subjective:     General appearance of alert, oriented, no complaints   Pain with rest: 3    Numbers   Pain with movement: 6    Numbers   Side Effects    1. Pruritis No    2. Nausea No    3. Motor Blockade No, 0=Ability to raise lower extremities off bed    4. Sedation No, 1=awake and alert    Objective:     Catheter site clean, dry, intact      Vitals   Vitals:    07/15/18 0744   BP: (!) 148/76   Pulse: 79   Resp: 18   Temp: 36.8 °C (98.3 °F)        Labs    Admission on 07/13/2018   Component Date Value Ref Range Status    Group & Rh 07/13/2018 B POS   Final    Indirect Azul 07/13/2018 NEG   Final    Sodium 07/14/2018 139  136 - 145 mmol/L Final    Potassium 07/14/2018 4.1  3.5 - 5.1 mmol/L Final    Chloride 07/14/2018 107  95 - 110 mmol/L Final    CO2 07/14/2018 27  23 - 29 mmol/L Final    Glucose 07/14/2018 128* 70 - 110 mg/dL Final    BUN, Bld 07/14/2018 19  8 - 23 mg/dL Final    Creatinine 07/14/2018 1.0  0.5 - 1.4 mg/dL Final    Calcium 07/14/2018 8.9  8.7 - 10.5 mg/dL Final    Anion Gap 07/14/2018 5* 8 - 16 mmol/L Final    eGFR if  07/14/2018 >60  >60 mL/min/1.73 m^2 Final    eGFR if non African American 07/14/2018 58* >60 mL/min/1.73 m^2 Final    Hemoglobin 07/14/2018 9.8* 12.0 - 16.0 g/dL Final    Hematocrit 07/14/2018 31.1* 37.0 - 48.5 % Final        Meds   Current Facility-Administered Medications   Medication Dose Route Frequency Provider Last Rate Last Dose    acetaminophen tablet 1,000 mg  1,000 mg Oral TID Christy Melchor PA-C   1,000 mg at  07/14/18 2159    aspirin EC tablet 81 mg  81 mg Oral Daily Christy Melchor PA-C   81 mg at 07/14/18 0952    bisacodyl suppository 10 mg  10 mg Rectal Daily PRN Christy Melchor PA-C        carvedilol tablet 3.125 mg  3.125 mg Oral BID Christy Melchor PA-C   3.125 mg at 07/14/18 2159    famotidine tablet 20 mg  20 mg Oral BID Christy Melchor PA-C   20 mg at 07/14/18 2159    ketorolac injection 15 mg  15 mg Intravenous Q6H Baljit Mead MD   15 mg at 07/15/18 0536    lactulose 20 gram/30 mL solution Soln 20 g  20 g Oral Q6H PRN Christy Melchor PA-C        losartan tablet 100 mg  100 mg Oral Daily Baljit Mead MD   100 mg at 07/14/18 0952    ondansetron disintegrating tablet 4 mg  4 mg Oral Q6H PRN Christy Melchor PA-C        ondansetron injection 4 mg  4 mg Intravenous Q6H PRN Christy Melchor PA-C        oxyCODONE immediate release tablet 10 mg  10 mg Oral Q3H PRN Christy Melchor PA-C   10 mg at 07/14/18 0958    oxyCODONE immediate release tablet 5 mg  5 mg Oral Q3H PRN Christy Melchor PA-C        polyethylene glycol packet 17 g  17 g Oral Daily Christy Melchor PA-C   17 g at 07/14/18 0952    ropivacaine 0.2% on-Q c-bloc 550 ml    Perineural Continuous Jeannette Benitez MD        ropivacaine 0.2% ON-Q C-BLOC 550 ML   Perineural Continuous Jeannette Benitez MD        senna-docusate 8.6-50 mg per tablet 1 tablet  1 tablet Oral BID Christy Melchor PA-C   1 tablet at 07/14/18 2159    simvastatin tablet 20 mg  20 mg Oral QHS Christy Melchor PA-C   20 mg at 07/14/18 2159    sodium chloride 0.9% flush 5 mL  5 mL Intravenous PRN Christy Melchor PA-C             Assessment:     Pain control adequate    Plan:     Discontinue present therapy. Analgesia to be provided by the primary team, pt  to pull catheter today    Evaluator Dylon Valverde

## 2018-07-15 NOTE — PROGRESS NOTES
Pt discharged to home with son.  Discharged with BSC and wheelchair.  Discharged via transport.  Given discharge instructions, verbalized understanding.

## 2018-07-16 NOTE — ADDENDUM NOTE
Addendum  created 07/16/18 1244 by Jass Brar MD    Anesthesia Event edited, Sign clinical note

## 2018-07-24 ENCOUNTER — TELEPHONE (OUTPATIENT)
Dept: ADMINISTRATIVE | Facility: CLINIC | Age: 68
End: 2018-07-24

## 2018-07-24 NOTE — TELEPHONE ENCOUNTER
Home Health SOC 07/16/2018 09/13/2018 with [facility] Dr Baljit Mead. SN, PT, OT services.

## 2018-07-27 ENCOUNTER — OFFICE VISIT (OUTPATIENT)
Dept: ORTHOPEDICS | Facility: CLINIC | Age: 68
End: 2018-07-27
Payer: COMMERCIAL

## 2018-07-27 ENCOUNTER — HOSPITAL ENCOUNTER (OUTPATIENT)
Dept: RADIOLOGY | Facility: HOSPITAL | Age: 68
Discharge: HOME | End: 2018-07-27
Attending: ORTHOPAEDIC SURGERY
Payer: COMMERCIAL

## 2018-07-27 VITALS — WEIGHT: 171 LBS | HEIGHT: 63 IN | BODY MASS INDEX: 30.3 KG/M2

## 2018-07-27 DIAGNOSIS — Z96.659 HISTORY OF TOTAL KNEE REPLACEMENT, UNSPECIFIED LATERALITY: Primary | ICD-10-CM

## 2018-07-27 DIAGNOSIS — M17.0 PRIMARY OSTEOARTHRITIS OF BOTH KNEES: Primary | ICD-10-CM

## 2018-07-27 DIAGNOSIS — M17.0 PRIMARY OSTEOARTHRITIS OF BOTH KNEES: ICD-10-CM

## 2018-07-27 PROCEDURE — 99999 PR PBB SHADOW E&M-EST. PATIENT-LVL II: CPT | Mod: PBBFAC,,, | Performed by: ORTHOPAEDIC SURGERY

## 2018-07-27 PROCEDURE — 73564 X-RAY EXAM KNEE 4 OR MORE: CPT | Mod: 26,50,, | Performed by: RADIOLOGY

## 2018-07-27 PROCEDURE — 99024 POSTOP FOLLOW-UP VISIT: CPT | Mod: S$GLB,,, | Performed by: ORTHOPAEDIC SURGERY

## 2018-07-27 PROCEDURE — 73564 X-RAY EXAM KNEE 4 OR MORE: CPT | Mod: TC,50,FY

## 2018-07-27 NOTE — PROGRESS NOTES
"Subjective:      Patient ID: Rigoberto Hughes is a 67 y.o. female.    Chief Complaint: Post-op Evaluation of the Left Knee and Post-op Evaluation of the Right Knee   Two week follow-up    HPI:  The patient is seen for postop follow-up of bilateral  TKA.  Pain control has been satisfactory  They feel that they are ambulating easily  Preoperative complaints include:  None      Current Outpatient Prescriptions:     aspirin (ECOTRIN) 81 MG EC tablet, Take 81 mg by mouth once daily., Disp: , Rfl:     b complex vitamins tablet, Take 1 tablet by mouth once daily., Disp: , Rfl:     carvedilol (COREG) 3.125 MG tablet, Take 1 tablet (3.125 mg total) by mouth 2 (two) times daily., Disp: 180 tablet, Rfl: 1    folic acid/multivit-min/lutein (CENTRUM SILVER ORAL), Take 1 tablet by mouth once daily., Disp: , Rfl:     HYDROcodone-acetaminophen (NORCO)  mg per tablet, Take 1 tablet by mouth every 4 (four) hours as needed for Pain., Disp: 60 tablet, Rfl: 0    losartan (COZAAR) 100 MG tablet, Take 100 mg by mouth once daily., Disp: , Rfl:     simvastatin (ZOCOR) 20 MG tablet, Take 1 tablet (20 mg total) by mouth every evening., Disp: 90 tablet, Rfl: 1  Review of patient's allergies indicates:  No Known Allergies    Ht 5' 3" (1.6 m)   Wt 77.6 kg (171 lb)   BMI 30.29 kg/m²     ROS        Objective:    Ortho Exam          Alert, oriented, no acute distress  non-ataxic  wound margins intact and healing well.  No signs of infection.  Range of motion: extension- 5 degrees; flexion- 90 degrees  Valgus/varus stability- stable  minimal    Assessment:     Imaging:  Pending        1. History of total knee replacement, unspecified laterality        Normal progress to this point        Plan:          No Follow-up on file.    Natural history of recovery from this operation explained in detail.    Continue physiotherapy    I explained gradual progression of normal activities with walker.        "

## 2018-09-06 ENCOUNTER — TELEPHONE (OUTPATIENT)
Dept: FAMILY MEDICINE | Facility: CLINIC | Age: 68
End: 2018-09-06

## 2018-09-06 NOTE — TELEPHONE ENCOUNTER
Spoke to pt's sonSujit and stated that pt needs a followup after knee surgery due to some redness around knees. Son could only come in on Monday, Thursday or Friday afternoon. Informed son to call pt's doctor who did the knee surgery and let them now that she is having problems with her surgery site. Pt's son, Sujit stated that he did not call the surgeon, but he will call and let them know and see if pt can be seen sooner than 9/24.

## 2018-09-19 ENCOUNTER — TELEPHONE (OUTPATIENT)
Dept: FAMILY MEDICINE | Facility: CLINIC | Age: 68
End: 2018-09-19

## 2018-09-19 NOTE — TELEPHONE ENCOUNTER
Spoke to pt's son, Sujit and needed a sooner appt. Informed son that Dr. Coleman did not have a sooner appt. Offered an appt with another physician, but son stated that he would have to speak to his mom before scheduling.

## 2018-09-19 NOTE — TELEPHONE ENCOUNTER
----- Message from Zeenat Gibson sent at 9/19/2018  9:44 AM CDT -----  Contact: Ayush,son, [phone]  Patient has an appointment scheduled on 9/24 but requests to be seen sooner. States rash on her knees has spread to her neck, chest and face. Please advise.

## 2018-09-24 ENCOUNTER — OFFICE VISIT (OUTPATIENT)
Dept: FAMILY MEDICINE | Facility: CLINIC | Age: 68
End: 2018-09-24
Attending: FAMILY MEDICINE
Payer: COMMERCIAL

## 2018-09-24 VITALS
SYSTOLIC BLOOD PRESSURE: 136 MMHG | BODY MASS INDEX: 28 KG/M2 | WEIGHT: 158.06 LBS | HEART RATE: 63 BPM | DIASTOLIC BLOOD PRESSURE: 76 MMHG | HEIGHT: 63 IN | OXYGEN SATURATION: 97 %

## 2018-09-24 DIAGNOSIS — I10 ESSENTIAL HYPERTENSION: Primary | ICD-10-CM

## 2018-09-24 DIAGNOSIS — Z11.59 ENCOUNTER FOR HEPATITIS C SCREENING TEST FOR LOW RISK PATIENT: ICD-10-CM

## 2018-09-24 DIAGNOSIS — E78.2 MIXED HYPERLIPIDEMIA: ICD-10-CM

## 2018-09-24 DIAGNOSIS — L85.3 DRY SKIN DERMATITIS: ICD-10-CM

## 2018-09-24 DIAGNOSIS — M17.0 PRIMARY OSTEOARTHRITIS OF BOTH KNEES: ICD-10-CM

## 2018-09-24 DIAGNOSIS — R73.09 ELEVATED GLUCOSE: ICD-10-CM

## 2018-09-24 DIAGNOSIS — Z12.31 ENCOUNTER FOR SCREENING MAMMOGRAM FOR BREAST CANCER: ICD-10-CM

## 2018-09-24 DIAGNOSIS — Z12.11 ENCOUNTER FOR FIT (FECAL IMMUNOCHEMICAL TEST) SCREENING: ICD-10-CM

## 2018-09-24 PROCEDURE — 99999 PR PBB SHADOW E&M-EST. PATIENT-LVL III: CPT | Mod: PBBFAC,,, | Performed by: FAMILY MEDICINE

## 2018-09-24 PROCEDURE — 99214 OFFICE O/P EST MOD 30 MIN: CPT | Mod: S$GLB,,, | Performed by: FAMILY MEDICINE

## 2018-09-24 PROCEDURE — 1101F PT FALLS ASSESS-DOCD LE1/YR: CPT | Mod: CPTII,S$GLB,, | Performed by: FAMILY MEDICINE

## 2018-09-24 PROCEDURE — 3078F DIAST BP <80 MM HG: CPT | Mod: CPTII,S$GLB,, | Performed by: FAMILY MEDICINE

## 2018-09-24 PROCEDURE — 3075F SYST BP GE 130 - 139MM HG: CPT | Mod: CPTII,S$GLB,, | Performed by: FAMILY MEDICINE

## 2018-09-24 RX ORDER — LOSARTAN POTASSIUM 100 MG/1
100 TABLET ORAL DAILY
Qty: 90 TABLET | Refills: 3 | Status: SHIPPED | OUTPATIENT
Start: 2018-09-24 | End: 2019-09-04 | Stop reason: SDUPTHER

## 2018-09-24 RX ORDER — DICLOFENAC SODIUM 10 MG/G
2 GEL TOPICAL DAILY
Qty: 100 G | Refills: 2 | Status: SHIPPED | OUTPATIENT
Start: 2018-09-24

## 2018-09-24 RX ORDER — CARVEDILOL 3.12 MG/1
3.12 TABLET ORAL 2 TIMES DAILY
Qty: 180 TABLET | Refills: 3 | Status: SHIPPED | OUTPATIENT
Start: 2018-09-24 | End: 2019-09-04 | Stop reason: SDUPTHER

## 2018-09-24 RX ORDER — TRIAMCINOLONE ACETONIDE 1 MG/G
OINTMENT TOPICAL 2 TIMES DAILY
Qty: 80 G | Refills: 2 | Status: SHIPPED | OUTPATIENT
Start: 2018-09-24 | End: 2020-07-13

## 2018-09-24 RX ORDER — SIMVASTATIN 20 MG/1
20 TABLET, FILM COATED ORAL NIGHTLY
Qty: 90 TABLET | Refills: 3 | Status: SHIPPED | OUTPATIENT
Start: 2018-09-24 | End: 2019-09-04 | Stop reason: SDUPTHER

## 2018-09-24 NOTE — PROGRESS NOTES
Subjective:       Patient ID: Rigoberto Hughes is a 68 y.o. female.    Chief Complaint: Medication Refill; Knee Pain (Right knee); and Rash    68 yr old pleasant Palestinian female with hypertension, hyperlipidemia, osteoarthritis knee, obesity and no other significant medical history presents today for her routine follow up and medication refills. C/o rash on her legs and arms, scaling and itching. Onset many months ago and gradually worsening.     OA knee - had knee replacement done and feels much better - still has pain in her right knee which is not bad unless she palpates - her gait is improved after surgery       HTN - controlled - on coreg and losartan - compliant - no side effects       HLD - lab due - on statin - no side effect      History as below - reviewed       HM  -declined vaccines  -FIT test due  -mammo due      Rash   This is a chronic problem. The current episode started 1 to 4 weeks ago. The problem is unchanged. The rash is left arm, right arm, right upper leg, right lower leg, left lower leg and left upper legdiffuse. The rash is characterized by burning, dryness and redness. She was exposed to nothing. Associated symptoms include joint pain. Pertinent negatives include no anorexia, congestion, cough, diarrhea, eye pain, facial edema, fatigue, fever, nail changes, rhinorrhea, shortness of breath, sore throat or vomiting. Past treatments include anti-itch cream. The treatment provided mild relief. There is no history of allergies, asthma, eczema or varicella.   Knee Pain    There was no injury mechanism. The pain is present in the right knee and left knee. The quality of the pain is described as aching. The pain is at a severity of 10/10. The pain is severe. The pain has been constant since onset. Pertinent negatives include no numbness. The symptoms are aggravated by movement, palpation and weight bearing. She has tried heat, ice, elevation, immobilization, NSAIDs and acetaminophen for the  symptoms. The treatment provided no relief.   Edema   This is a new problem. The current episode started 1 to 4 weeks ago. The problem occurs constantly. The problem has been unchanged. Associated symptoms include arthralgias, joint swelling, myalgias and a rash. Pertinent negatives include no abdominal pain, anorexia, change in bowel habit, chest pain, chills, congestion, coughing, diaphoresis, fatigue, fever, headaches, nausea, numbness, sore throat, swollen glands, visual change, vomiting or weakness. The symptoms are aggravated by walking and twisting. She has tried NSAIDs, sleep, rest, oral narcotics and heat for the symptoms. The treatment provided mild relief.   Hypertension   This is a chronic problem. The current episode started more than 1 month ago. The problem has been gradually improving since onset. The problem is controlled. Pertinent negatives include no chest pain, headaches or shortness of breath. There are no associated agents to hypertension. Risk factors for coronary artery disease include obesity and post-menopausal state. Past treatments include angiotensin blockers and beta blockers. The current treatment provides significant improvement. There are no compliance problems.  There is no history of angina, CAD/MI, CVA, left ventricular hypertrophy or PVD. There is no history of chronic renal disease, coarctation of the aorta, hypercortisolism, a hypertension causing med, pheochromocytoma or sleep apnea.   Hyperlipidemia   This is a chronic problem. The current episode started more than 1 year ago. The problem is controlled. Recent lipid tests were reviewed and are normal. She has no history of chronic renal disease, hypothyroidism, obesity or nephrotic syndrome. There are no known factors aggravating her hyperlipidemia. Associated symptoms include myalgias. Pertinent negatives include no chest pain or shortness of breath. The current treatment provides significant improvement of lipids. There  are no compliance problems.  Risk factors for coronary artery disease include dyslipidemia and hypertension.     Review of Systems   Constitutional: Negative.  Negative for activity change, chills, diaphoresis, fatigue, fever and unexpected weight change.   HENT: Negative.  Negative for congestion, ear discharge, hearing loss, rhinorrhea, sore throat and voice change.    Eyes: Negative.  Negative for pain, discharge and visual disturbance.   Respiratory: Negative.  Negative for cough, chest tightness, shortness of breath and wheezing.    Cardiovascular: Negative.  Negative for chest pain.   Gastrointestinal: Negative.  Negative for abdominal distention, abdominal pain, anal bleeding, anorexia, change in bowel habit, constipation, diarrhea, nausea and vomiting.   Endocrine: Negative.  Negative for cold intolerance, polydipsia and polyuria.   Genitourinary: Negative.  Negative for decreased urine volume, difficulty urinating, dysuria, frequency, menstrual problem and vaginal pain.   Musculoskeletal: Positive for arthralgias, joint pain, joint swelling and myalgias. Negative for gait problem.   Skin: Positive for rash. Negative for color change, nail changes, pallor and wound.   Allergic/Immunologic: Negative.  Negative for environmental allergies and immunocompromised state.   Neurological: Negative.  Negative for dizziness, tremors, seizures, speech difficulty, weakness, numbness and headaches.   Hematological: Negative.  Negative for adenopathy. Does not bruise/bleed easily.   Psychiatric/Behavioral: Negative.  Negative for agitation, confusion, decreased concentration, hallucinations, self-injury and suicidal ideas. The patient is not nervous/anxious.        PMH/PSH/FH/SH/MED/ALLERGY reviewed    Objective:       Vitals:    09/24/18 1500   BP: 136/76   Pulse: 63       Physical Exam   Constitutional: She is oriented to person, place, and time. She appears well-developed and well-nourished. No distress.   HENT:    Head: Normocephalic and atraumatic.   Right Ear: External ear normal.   Left Ear: External ear normal.   Nose: Nose normal.   Mouth/Throat: Oropharynx is clear and moist. No oropharyngeal exudate.   Eyes: Conjunctivae and EOM are normal. Pupils are equal, round, and reactive to light. Right eye exhibits no discharge. Left eye exhibits no discharge. No scleral icterus.   Neck: Normal range of motion. Neck supple. No JVD present. No tracheal deviation present. No thyromegaly present.   Cardiovascular: Normal rate, regular rhythm, normal heart sounds and intact distal pulses. Exam reveals no gallop and no friction rub.   No murmur heard.  Pulmonary/Chest: Effort normal and breath sounds normal. No stridor. She has no wheezes. She has no rales. She exhibits no tenderness.   Abdominal: Soft. Bowel sounds are normal. She exhibits no distension and no mass. There is no tenderness. There is no rebound and no guarding. No hernia.   Musculoskeletal: Normal range of motion. She exhibits no edema or tenderness.   Lymphadenopathy:     She has no cervical adenopathy.   Neurological: She is alert and oriented to person, place, and time. She has normal reflexes. She displays normal reflexes. No cranial nerve deficit. She exhibits normal muscle tone. Coordination normal.   Skin: Skin is warm and dry. Rash (scaly, dry rash on both lower extremity) noted. She is not diaphoretic. There is erythema (mild erythema lateral aspect of right knee with mild TTP). No pallor.   Psychiatric: She has a normal mood and affect. Her behavior is normal. Judgment and thought content normal.       Assessment:       1. Essential hypertension    2. Mixed hyperlipidemia    3. Primary osteoarthritis of both knees    4. Encounter for screening mammogram for breast cancer    5. Encounter for hepatitis C screening test for low risk patient    6. Encounter for FIT (fecal immunochemical test) screening    7. Elevated glucose    8. Dry skin dermatitis         Plan:       Rigoberto was seen today for medication refill, knee pain and rash.    Diagnoses and all orders for this visit:    Essential hypertension  -     losartan (COZAAR) 100 MG tablet; Take 1 tablet (100 mg total) by mouth once daily.  -     carvedilol (COREG) 3.125 MG tablet; Take 1 tablet (3.125 mg total) by mouth 2 (two) times daily.  -     CBC auto differential; Future  -     Comprehensive metabolic panel; Future  -     Lipid panel; Future    Mixed hyperlipidemia  -     simvastatin (ZOCOR) 20 MG tablet; Take 1 tablet (20 mg total) by mouth every evening.  -     CBC auto differential; Future  -     Comprehensive metabolic panel; Future  -     Lipid panel; Future    Primary osteoarthritis of both knees  -     diclofenac sodium (VOLTAREN) 1 % Gel; Apply 2 g topically once daily.    Encounter for screening mammogram for breast cancer  -     Mammo Digital Screening Bilat with CAD; Future    Encounter for hepatitis C screening test for low risk patient  -     Hepatitis C antibody; Future    Encounter for FIT (fecal immunochemical test) screening  -     Fecal Immunochemical Test (iFOBT); Future    Elevated glucose  -     Hemoglobin A1c; Future    Dry skin dermatitis  -     triamcinolone acetonide 0.1% (KENALOG) 0.1 % ointment; Apply topically 2 (two) times daily. for 10 days    Primary osteoarthritis knee  -ice application, leg elevation  -aspercreme local application  -follow ortho  -ER precautions given      HTN  -controlled    HLD  -lab due    Obesity  -diet/exercise and weight loss    Spent adequate time in obtaining history and explaining differentials    30 minutes spent during this visit of which greater than 50% devoted to face-face counseling and coordination of care regarding diagnosis and management plan    Follow-up in about 6 months (around 3/24/2019), or if symptoms worsen or fail to improve.

## 2019-06-03 ENCOUNTER — PATIENT OUTREACH (OUTPATIENT)
Dept: ADMINISTRATIVE | Facility: HOSPITAL | Age: 69
End: 2019-06-03

## 2019-08-27 ENCOUNTER — PATIENT OUTREACH (OUTPATIENT)
Dept: ADMINISTRATIVE | Facility: HOSPITAL | Age: 69
End: 2019-08-27

## 2019-08-28 ENCOUNTER — PATIENT OUTREACH (OUTPATIENT)
Dept: ADMINISTRATIVE | Facility: HOSPITAL | Age: 69
End: 2019-08-28

## 2019-08-28 ENCOUNTER — PATIENT MESSAGE (OUTPATIENT)
Dept: ADMINISTRATIVE | Facility: HOSPITAL | Age: 69
End: 2019-08-28

## 2019-09-04 DIAGNOSIS — I10 ESSENTIAL HYPERTENSION: ICD-10-CM

## 2019-09-04 DIAGNOSIS — E78.2 MIXED HYPERLIPIDEMIA: ICD-10-CM

## 2019-09-04 RX ORDER — LOSARTAN POTASSIUM 100 MG/1
TABLET ORAL
Qty: 90 TABLET | Refills: 0 | Status: SHIPPED | OUTPATIENT
Start: 2019-09-04 | End: 2019-11-30 | Stop reason: SDUPTHER

## 2019-09-04 RX ORDER — SIMVASTATIN 20 MG/1
TABLET, FILM COATED ORAL
Qty: 90 TABLET | Refills: 0 | Status: SHIPPED | OUTPATIENT
Start: 2019-09-04 | End: 2019-11-30 | Stop reason: SDUPTHER

## 2019-09-04 RX ORDER — CARVEDILOL 3.12 MG/1
TABLET ORAL
Qty: 180 TABLET | Refills: 0 | Status: SHIPPED | OUTPATIENT
Start: 2019-09-04 | End: 2019-11-30 | Stop reason: SDUPTHER

## 2019-11-30 DIAGNOSIS — I10 ESSENTIAL HYPERTENSION: ICD-10-CM

## 2019-11-30 DIAGNOSIS — E78.2 MIXED HYPERLIPIDEMIA: ICD-10-CM

## 2019-12-01 RX ORDER — CARVEDILOL 3.12 MG/1
TABLET ORAL
Qty: 180 TABLET | Refills: 0 | Status: SHIPPED | OUTPATIENT
Start: 2019-12-01 | End: 2020-02-27

## 2019-12-01 RX ORDER — LOSARTAN POTASSIUM 100 MG/1
TABLET ORAL
Qty: 90 TABLET | Refills: 0 | Status: SHIPPED | OUTPATIENT
Start: 2019-12-01 | End: 2020-02-27

## 2019-12-01 RX ORDER — SIMVASTATIN 20 MG/1
TABLET, FILM COATED ORAL
Qty: 90 TABLET | Refills: 0 | Status: SHIPPED | OUTPATIENT
Start: 2019-12-01 | End: 2020-02-27

## 2019-12-02 NOTE — TELEPHONE ENCOUNTER
Informed pt's sonSujit that Dr. Coleman refilled his mother's medication and to schedule an appt with Dr. Coleman. [place] but son will call back and schedule an appt.

## 2020-01-13 ENCOUNTER — PATIENT MESSAGE (OUTPATIENT)
Dept: ADMINISTRATIVE | Facility: HOSPITAL | Age: 70
End: 2020-01-13

## 2020-01-13 ENCOUNTER — PATIENT OUTREACH (OUTPATIENT)
Dept: ADMINISTRATIVE | Facility: HOSPITAL | Age: 70
End: 2020-01-13

## 2020-01-30 ENCOUNTER — TELEPHONE (OUTPATIENT)
Dept: FAMILY MEDICINE | Facility: CLINIC | Age: 70
End: 2020-01-30

## 2020-01-30 DIAGNOSIS — Z00.00 ROUTINE GENERAL MEDICAL EXAMINATION AT A HEALTH CARE FACILITY: Primary | ICD-10-CM

## 2020-01-30 DIAGNOSIS — E78.2 MIXED HYPERLIPIDEMIA: ICD-10-CM

## 2020-01-30 DIAGNOSIS — Z11.59 ENCOUNTER FOR HEPATITIS C SCREENING TEST FOR LOW RISK PATIENT: ICD-10-CM

## 2020-01-30 DIAGNOSIS — I10 ESSENTIAL HYPERTENSION: ICD-10-CM

## 2020-01-30 NOTE — TELEPHONE ENCOUNTER
----- Message from Krissy Gregorio sent at 1/30/2020  8:40 AM CST -----  Contact: [phone]/self  Patient is requesting orders for ANNUAL lab work sent to [facility]. Thanks

## 2020-02-06 ENCOUNTER — PATIENT MESSAGE (OUTPATIENT)
Dept: ADMINISTRATIVE | Facility: HOSPITAL | Age: 70
End: 2020-02-06

## 2020-02-06 ENCOUNTER — PATIENT OUTREACH (OUTPATIENT)
Dept: ADMINISTRATIVE | Facility: HOSPITAL | Age: 70
End: 2020-02-06

## 2020-02-27 ENCOUNTER — LAB VISIT (OUTPATIENT)
Dept: LAB | Facility: HOSPITAL | Age: 70
End: 2020-02-27
Attending: FAMILY MEDICINE
Payer: MEDICAID

## 2020-02-27 DIAGNOSIS — E78.2 MIXED HYPERLIPIDEMIA: ICD-10-CM

## 2020-02-27 DIAGNOSIS — I10 ESSENTIAL HYPERTENSION: ICD-10-CM

## 2020-02-27 DIAGNOSIS — Z11.59 ENCOUNTER FOR HEPATITIS C SCREENING TEST FOR LOW RISK PATIENT: ICD-10-CM

## 2020-02-27 DIAGNOSIS — Z00.00 ROUTINE GENERAL MEDICAL EXAMINATION AT A HEALTH CARE FACILITY: ICD-10-CM

## 2020-02-27 LAB
ALBUMIN SERPL BCP-MCNC: 3.5 G/DL (ref 3.5–5.2)
ALP SERPL-CCNC: 57 U/L (ref 55–135)
ALT SERPL W/O P-5'-P-CCNC: 15 U/L (ref 10–44)
ANION GAP SERPL CALC-SCNC: 9 MMOL/L (ref 8–16)
AST SERPL-CCNC: 24 U/L (ref 10–40)
BASOPHILS # BLD AUTO: 0.04 K/UL (ref 0–0.2)
BASOPHILS NFR BLD: 0.5 % (ref 0–1.9)
BILIRUB SERPL-MCNC: 0.3 MG/DL (ref 0.1–1)
BUN SERPL-MCNC: 31 MG/DL (ref 8–23)
CALCIUM SERPL-MCNC: 10.1 MG/DL (ref 8.7–10.5)
CHLORIDE SERPL-SCNC: 104 MMOL/L (ref 95–110)
CHOLEST SERPL-MCNC: 174 MG/DL (ref 120–199)
CHOLEST/HDLC SERPL: 3.7 {RATIO} (ref 2–5)
CO2 SERPL-SCNC: 26 MMOL/L (ref 23–29)
CREAT SERPL-MCNC: 1.6 MG/DL (ref 0.5–1.4)
DIFFERENTIAL METHOD: ABNORMAL
EOSINOPHIL # BLD AUTO: 0 K/UL (ref 0–0.5)
EOSINOPHIL NFR BLD: 0 % (ref 0–8)
ERYTHROCYTE [DISTWIDTH] IN BLOOD BY AUTOMATED COUNT: 14.7 % (ref 11.5–14.5)
EST. GFR  (AFRICAN AMERICAN): 37 ML/MIN/1.73 M^2
EST. GFR  (NON AFRICAN AMERICAN): 32 ML/MIN/1.73 M^2
GLUCOSE SERPL-MCNC: 88 MG/DL (ref 70–110)
HCT VFR BLD AUTO: 42.2 % (ref 37–48.5)
HCV AB SERPL QL IA: NEGATIVE
HDLC SERPL-MCNC: 47 MG/DL (ref 40–75)
HDLC SERPL: 27 % (ref 20–50)
HGB BLD-MCNC: 12.9 G/DL (ref 12–16)
IMM GRANULOCYTES # BLD AUTO: 0.02 K/UL (ref 0–0.04)
IMM GRANULOCYTES NFR BLD AUTO: 0.3 % (ref 0–0.5)
LDLC SERPL CALC-MCNC: 105.2 MG/DL (ref 63–159)
LYMPHOCYTES # BLD AUTO: 2.3 K/UL (ref 1–4.8)
LYMPHOCYTES NFR BLD: 29.5 % (ref 18–48)
MCH RBC QN AUTO: 25.8 PG (ref 27–31)
MCHC RBC AUTO-ENTMCNC: 30.6 G/DL (ref 32–36)
MCV RBC AUTO: 84 FL (ref 82–98)
MONOCYTES # BLD AUTO: 0.5 K/UL (ref 0.3–1)
MONOCYTES NFR BLD: 6.1 % (ref 4–15)
NEUTROPHILS # BLD AUTO: 5 K/UL (ref 1.8–7.7)
NEUTROPHILS NFR BLD: 63.6 % (ref 38–73)
NONHDLC SERPL-MCNC: 127 MG/DL
NRBC BLD-RTO: 0 /100 WBC
PLATELET # BLD AUTO: 500 K/UL (ref 150–350)
PMV BLD AUTO: 9.5 FL (ref 9.2–12.9)
POTASSIUM SERPL-SCNC: 5 MMOL/L (ref 3.5–5.1)
PROT SERPL-MCNC: 8.4 G/DL (ref 6–8.4)
RBC # BLD AUTO: 5 M/UL (ref 4–5.4)
SODIUM SERPL-SCNC: 139 MMOL/L (ref 136–145)
TRIGL SERPL-MCNC: 109 MG/DL (ref 30–150)
WBC # BLD AUTO: 7.92 K/UL (ref 3.9–12.7)

## 2020-02-27 PROCEDURE — 36415 COLL VENOUS BLD VENIPUNCTURE: CPT

## 2020-02-27 PROCEDURE — 80061 LIPID PANEL: CPT

## 2020-02-27 PROCEDURE — 86803 HEPATITIS C AB TEST: CPT

## 2020-02-27 PROCEDURE — 85025 COMPLETE CBC W/AUTO DIFF WBC: CPT

## 2020-02-27 PROCEDURE — 80053 COMPREHEN METABOLIC PANEL: CPT

## 2020-02-27 RX ORDER — SIMVASTATIN 20 MG/1
TABLET, FILM COATED ORAL
Qty: 90 TABLET | Refills: 0 | Status: SHIPPED | OUTPATIENT
Start: 2020-02-27 | End: 2020-03-05

## 2020-02-27 RX ORDER — CARVEDILOL 3.12 MG/1
TABLET ORAL
Qty: 180 TABLET | Refills: 0 | Status: SHIPPED | OUTPATIENT
Start: 2020-02-27 | End: 2020-03-05 | Stop reason: SDUPTHER

## 2020-02-28 RX ORDER — LOSARTAN POTASSIUM 100 MG/1
TABLET ORAL
Qty: 90 TABLET | Refills: 4 | Status: SHIPPED | OUTPATIENT
Start: 2020-02-28 | End: 2020-03-05 | Stop reason: SDUPTHER

## 2020-03-05 ENCOUNTER — OFFICE VISIT (OUTPATIENT)
Dept: FAMILY MEDICINE | Facility: CLINIC | Age: 70
End: 2020-03-05
Attending: FAMILY MEDICINE
Payer: MEDICAID

## 2020-03-05 VITALS
DIASTOLIC BLOOD PRESSURE: 72 MMHG | BODY MASS INDEX: 28.79 KG/M2 | HEIGHT: 63 IN | WEIGHT: 162.5 LBS | HEART RATE: 53 BPM | SYSTOLIC BLOOD PRESSURE: 139 MMHG

## 2020-03-05 DIAGNOSIS — M17.0 PRIMARY OSTEOARTHRITIS OF BOTH KNEES: ICD-10-CM

## 2020-03-05 DIAGNOSIS — E78.2 MIXED HYPERLIPIDEMIA: ICD-10-CM

## 2020-03-05 DIAGNOSIS — Z00.00 ROUTINE GENERAL MEDICAL EXAMINATION AT HEALTH CARE FACILITY: Primary | ICD-10-CM

## 2020-03-05 DIAGNOSIS — I10 ESSENTIAL HYPERTENSION: ICD-10-CM

## 2020-03-05 PROCEDURE — 99397 PER PM REEVAL EST PAT 65+ YR: CPT | Mod: S$PBB,,, | Performed by: FAMILY MEDICINE

## 2020-03-05 PROCEDURE — 99999 PR PBB SHADOW E&M-EST. PATIENT-LVL III: ICD-10-PCS | Mod: PBBFAC,,, | Performed by: FAMILY MEDICINE

## 2020-03-05 PROCEDURE — 99213 OFFICE O/P EST LOW 20 MIN: CPT | Mod: PBBFAC,PO | Performed by: FAMILY MEDICINE

## 2020-03-05 PROCEDURE — 99397 PR PREVENTIVE VISIT,EST,65 & OVER: ICD-10-PCS | Mod: S$PBB,,, | Performed by: FAMILY MEDICINE

## 2020-03-05 PROCEDURE — 99999 PR PBB SHADOW E&M-EST. PATIENT-LVL III: CPT | Mod: PBBFAC,,, | Performed by: FAMILY MEDICINE

## 2020-03-05 RX ORDER — SIMVASTATIN 10 MG/1
10 TABLET, FILM COATED ORAL NIGHTLY
Qty: 90 TABLET | Refills: 3 | Status: SHIPPED | OUTPATIENT
Start: 2020-03-05 | End: 2021-02-01 | Stop reason: SDUPTHER

## 2020-03-05 RX ORDER — LOSARTAN POTASSIUM 100 MG/1
TABLET ORAL
Qty: 90 TABLET | Refills: 4 | Status: SHIPPED | OUTPATIENT
Start: 2020-03-05 | End: 2021-02-01 | Stop reason: SDUPTHER

## 2020-03-05 RX ORDER — CARVEDILOL 3.12 MG/1
TABLET ORAL
Qty: 180 TABLET | Refills: 3 | Status: SHIPPED | OUTPATIENT
Start: 2020-03-05 | End: 2021-02-01 | Stop reason: SDUPTHER

## 2020-03-05 NOTE — PROGRESS NOTES
Subjective:       Patient ID: Rigoberto Hughes is a 69 y.o. female.    Chief Complaint: Annual Exam    69 yr old pleasant [place] female with hypertension, hyperlipidemia, osteoarthritis knee, obesity and no other significant medical history presents today for her annual wellness check, lab work, and routine follow up and medication refills.     OA knee - had knee replacement done and feels much better - still has pain in her right knee which is not bad unless she palpates - her gait is improved after surgery       HTN - controlled - on coreg and losartan - compliant - no side effects       HLD - lab due - on statin - no side effect      History as below - reviewed       HM  -declined vaccines  -FIT test due  -mammo due    Rash   This is a chronic problem. The current episode started 1 to 4 weeks ago. The problem is unchanged. The affected locations include the left arm, right arm, right upper leg, right lower leg, left lower leg and left upper leg. The rash is characterized by burning, dryness and redness. She was exposed to nothing. Associated symptoms include joint pain. Pertinent negatives include no anorexia, congestion, cough, diarrhea, eye pain, facial edema, fatigue, fever, nail changes, rhinorrhea, shortness of breath, sore throat or vomiting. Past treatments include anti-itch cream. The treatment provided mild relief. There is no history of allergies, asthma, eczema or varicella.   Knee Pain    There was no injury mechanism. The pain is present in the right knee and left knee. The quality of the pain is described as aching. The pain is at a severity of 10/10. The pain is severe. The pain has been constant since onset. Pertinent negatives include no numbness. The symptoms are aggravated by movement, palpation and weight bearing. She has tried heat, ice, elevation, immobilization, NSAIDs and acetaminophen for the symptoms. The treatment provided no relief.   Edema   This is a new problem. The current episode  started 1 to 4 weeks ago. The problem occurs constantly. The problem has been unchanged. Associated symptoms include arthralgias, joint swelling, myalgias, neck pain and a rash. Pertinent negatives include no abdominal pain, anorexia, change in bowel habit, chest pain, chills, congestion, coughing, diaphoresis, fatigue, fever, headaches, nausea, numbness, sore throat, swollen glands, visual change, vomiting or weakness. The symptoms are aggravated by walking and twisting. She has tried NSAIDs, sleep, rest, oral narcotics and heat for the symptoms. The treatment provided mild relief.   Hypertension   This is a chronic problem. The current episode started more than 1 month ago. The problem has been gradually improving since onset. The problem is controlled. Associated symptoms include neck pain. Pertinent negatives include no chest pain, headaches, palpitations or shortness of breath. There are no associated agents to hypertension. Risk factors for coronary artery disease include obesity and post-menopausal state. Past treatments include angiotensin blockers and beta blockers. The current treatment provides significant improvement. There are no compliance problems.  There is no history of angina, CAD/MI, CVA, left ventricular hypertrophy or PVD. There is no history of chronic renal disease, coarctation of the aorta, hypercortisolism, a hypertension causing med, pheochromocytoma or sleep apnea.   Hyperlipidemia   This is a chronic problem. The current episode started more than 1 year ago. The problem is controlled. Recent lipid tests were reviewed and are normal. She has no history of chronic renal disease, hypothyroidism, obesity or nephrotic syndrome. There are no known factors aggravating her hyperlipidemia. Associated symptoms include myalgias. Pertinent negatives include no chest pain or shortness of breath. The current treatment provides significant improvement of lipids. There are no compliance problems.  Risk  factors for coronary artery disease include dyslipidemia and hypertension.     Review of Systems   Constitutional: Negative.  Negative for activity change, chills, diaphoresis, fatigue, fever and unexpected weight change.   HENT: Negative.  Negative for congestion, ear discharge, hearing loss, rhinorrhea, sore throat, trouble swallowing and voice change.    Eyes: Negative.  Negative for pain, discharge and visual disturbance.   Respiratory: Negative.  Negative for cough, chest tightness, shortness of breath and wheezing.    Cardiovascular: Negative.  Negative for chest pain and palpitations.   Gastrointestinal: Negative.  Negative for abdominal distention, abdominal pain, anal bleeding, anorexia, blood in stool, change in bowel habit, constipation, diarrhea, nausea and vomiting.   Endocrine: Negative.  Negative for cold intolerance, polydipsia and polyuria.   Genitourinary: Negative.  Negative for decreased urine volume, difficulty urinating, dysuria, frequency, hematuria, menstrual problem and vaginal pain.   Musculoskeletal: Positive for arthralgias, joint pain, joint swelling, myalgias and neck pain. Negative for gait problem.   Skin: Positive for rash. Negative for color change, nail changes, pallor and wound.   Allergic/Immunologic: Negative.  Negative for environmental allergies and immunocompromised state.   Neurological: Negative.  Negative for dizziness, tremors, seizures, speech difficulty, weakness, numbness and headaches.   Hematological: Negative.  Negative for adenopathy. Does not bruise/bleed easily.   Psychiatric/Behavioral: Negative.  Negative for agitation, confusion, decreased concentration, dysphoric mood, hallucinations, self-injury and suicidal ideas. The patient is not nervous/anxious.        Active Ambulatory Problems     Diagnosis Date Noted    Mixed hyperlipidemia 05/03/2018    Essential hypertension 05/03/2018    Effusion of both knee joints 05/03/2018    Primary osteoarthritis of both  knees 2018     Resolved Ambulatory Problems     Diagnosis Date Noted    BMI 30.0-30.9,adult 2018     Past Medical History:   Diagnosis Date    Arthritis     Hyperlipidemia     Hypertension      Past Surgical History:   Procedure Laterality Date    ARTHROPLASTY OF BOTH KNEES Bilateral 2018    Procedure: ARTHROPLASTY-KNEE-BILATERAL;  Surgeon: Baljit Mead MD;  Location: [facility] OR;  Service: Orthopedics;  Laterality: Bilateral;  depuy (Nishant notified)     SECTION      UMBILICAL HERNIA REPAIR       Family History   Problem Relation Age of Onset    Heart disease Mother     Diabetes Father     Heart disease Father      Social History     Socioeconomic History    Marital status: Single     Spouse name: Not on file    Number of children: Not on file    Years of education: Not on file    Highest education level: Not on file   Occupational History    Not on file   Social Needs    Financial resource strain: Not hard at all    Food insecurity:     Worry: Never true     Inability: Never true    Transportation needs:     Medical: No     Non-medical: No   Tobacco Use    Smoking status: Never Smoker    Smokeless tobacco: Never Used   Substance and Sexual Activity    Alcohol use: No     Frequency: Never     Binge frequency: Never    Drug use: No    Sexual activity: Never   Lifestyle    Physical activity:     Days per week: 7 days     Minutes per session: 20 min    Stress: Rather much   Relationships    Social connections:     Talks on phone: More than three times a week     Gets together: Never     Attends Yarsanism service: Not on file     Active member of club or organization: No     Attends meetings of clubs or organizations: Never     Relationship status:    Other Topics Concern    Not on file   Social History Narrative    Not on file     Review of patient's allergies indicates:  No Known Allergies  Current Outpatient Medications on File Prior to Visit    Medication Sig Dispense Refill    aspirin (ECOTRIN) 81 MG EC tablet Take 81 mg by mouth once daily.      b complex vitamins tablet Take 1 tablet by mouth once daily.      folic acid/multivit-min/lutein (CENTRUM SILVER ORAL) Take 1 tablet by mouth once daily.      [DISCONTINUED] carvediloL (COREG) 3.125 MG tablet TAKE 1 TABLET(3.125 MG) BY MOUTH TWICE DAILY 180 tablet 0    [DISCONTINUED] losartan (COZAAR) 100 MG tablet TAKE 1 TABLET(100 MG) BY MOUTH EVERY DAY 90 tablet 4    diclofenac sodium (VOLTAREN) 1 % Gel Apply 2 g topically once daily. (Patient not taking: Reported on 3/5/2020) 100 g 2    HYDROcodone-acetaminophen (NORCO)  mg per tablet Take 1 tablet by mouth every 4 (four) hours as needed for Pain. (Patient not taking: Reported on 3/5/2020) 60 tablet 0    triamcinolone acetonide 0.1% (KENALOG) 0.1 % ointment Apply topically 2 (two) times daily. for 10 days 80 g 2    [DISCONTINUED] simvastatin (ZOCOR) 20 MG tablet TAKE 1 TABLET(20 MG) BY MOUTH EVERY EVENING (Patient not taking: Reported on 3/5/2020) 90 tablet 0     No current facility-administered medications on file prior to visit.        Objective:       Vitals:    03/05/20 1400   BP: 139/72   Pulse: (!) 53       Physical Exam   Constitutional: She is oriented to person, place, and time. She appears well-developed and well-nourished. No distress.   HENT:   Head: Normocephalic and atraumatic.   Right Ear: External ear normal.   Left Ear: External ear normal.   Nose: Nose normal.   Mouth/Throat: Oropharynx is clear and moist. No oropharyngeal exudate.   Eyes: Pupils are equal, round, and reactive to light. Conjunctivae and EOM are normal. Right eye exhibits no discharge. Left eye exhibits no discharge. No scleral icterus.   Neck: Normal range of motion. Neck supple. No JVD present. No tracheal deviation present. No thyromegaly present.   Cardiovascular: Normal rate, regular rhythm, normal heart sounds and intact distal pulses. Exam reveals no  gallop and no friction rub.   No murmur heard.  Pulmonary/Chest: Effort normal and breath sounds normal. No stridor. She has no wheezes. She has no rales. She exhibits no tenderness.   Abdominal: Soft. Bowel sounds are normal. She exhibits no distension and no mass. There is no tenderness. There is no rebound and no guarding. No hernia.   Musculoskeletal: Normal range of motion. She exhibits no edema or tenderness.   Lymphadenopathy:     She has no cervical adenopathy.   Neurological: She is alert and oriented to person, place, and time. She has normal reflexes. She displays normal reflexes. No cranial nerve deficit. She exhibits normal muscle tone. Coordination normal.   Skin: Skin is warm and dry. Rash (scaly, dry rash on both lower extremity) noted. She is not diaphoretic. There is erythema (mild erythema lateral aspect of right knee with mild TTP). No pallor.   Psychiatric: She has a normal mood and affect. Her behavior is normal. Judgment and thought content normal.       Assessment:       1. Routine general medical examination at health care facility    2. Mixed hyperlipidemia    3. Primary osteoarthritis of both knees    4. Essential hypertension        Plan:           Rigoberto was seen today for annual exam.    Diagnoses and all orders for this visit:    Routine general medical examination at health care facility  -     Urinalysis; Future    Mixed hyperlipidemia  -     simvastatin (ZOCOR) 10 MG tablet; Take 1 tablet (10 mg total) by mouth every evening.    Primary osteoarthritis of both knees    Essential hypertension  -     losartan (COZAAR) 100 MG tablet; TAKE 1 TABLET(100 MG) BY MOUTH EVERY DAY  -     carvediloL (COREG) 3.125 MG tablet; TAKE 1 TABLET(3.125 MG) BY MOUTH TWICE DAILY  -     Urinalysis; Future      Wellness check  -normal exam  -labs    Primary osteoarthritis knee  -ice application, leg elevation  -aspercreme local application  -follow ortho  -ER precautions  given      HTN  -controlled    HLD  -lab due    Obesity  -diet/exercise and weight loss    Spent adequate time in obtaining history and explaining differentials      Follow up in about 16 weeks (around 6/25/2020), or if symptoms worsen or fail to improve.

## 2020-03-06 ENCOUNTER — PATIENT MESSAGE (OUTPATIENT)
Dept: FAMILY MEDICINE | Facility: CLINIC | Age: 70
End: 2020-03-06

## 2020-03-06 DIAGNOSIS — Z12.11 COLON CANCER SCREENING: ICD-10-CM

## 2020-03-06 DIAGNOSIS — A49.9 BACTERIAL UTI: Primary | ICD-10-CM

## 2020-03-06 DIAGNOSIS — N39.0 BACTERIAL UTI: Primary | ICD-10-CM

## 2020-03-06 RX ORDER — AMOXICILLIN 500 MG/1
500 TABLET, FILM COATED ORAL EVERY 12 HOURS
Qty: 20 TABLET | Refills: 0 | Status: SHIPPED | OUTPATIENT
Start: 2020-03-06 | End: 2020-03-16

## 2020-03-06 NOTE — TELEPHONE ENCOUNTER
Informed pt's son that pt's urine showed possible infection and Dr. Coleman has sent in antibiotic to the pharmacy - take as directed

## 2020-03-13 DIAGNOSIS — Z12.39 BREAST CANCER SCREENING: ICD-10-CM

## 2020-04-13 ENCOUNTER — PATIENT OUTREACH (OUTPATIENT)
Dept: ADMINISTRATIVE | Facility: HOSPITAL | Age: 70
End: 2020-04-13

## 2020-04-22 ENCOUNTER — PATIENT OUTREACH (OUTPATIENT)
Dept: ADMINISTRATIVE | Facility: HOSPITAL | Age: 70
End: 2020-04-22

## 2020-06-11 ENCOUNTER — PATIENT OUTREACH (OUTPATIENT)
Dept: ADMINISTRATIVE | Facility: HOSPITAL | Age: 70
End: 2020-06-11

## 2020-06-11 DIAGNOSIS — N95.9 MENOPAUSAL AND POSTMENOPAUSAL DISORDER: Primary | ICD-10-CM

## 2020-07-13 ENCOUNTER — HOSPITAL ENCOUNTER (OUTPATIENT)
Dept: RADIOLOGY | Facility: HOSPITAL | Age: 70
Discharge: HOME | End: 2020-07-13
Attending: FAMILY MEDICINE
Payer: MEDICAID

## 2020-07-13 ENCOUNTER — OFFICE VISIT (OUTPATIENT)
Dept: FAMILY MEDICINE | Facility: CLINIC | Age: 70
End: 2020-07-13
Attending: FAMILY MEDICINE
Payer: MEDICAID

## 2020-07-13 VITALS
OXYGEN SATURATION: 95 % | SYSTOLIC BLOOD PRESSURE: 132 MMHG | WEIGHT: 175.06 LBS | BODY MASS INDEX: 31.02 KG/M2 | TEMPERATURE: 98 F | HEART RATE: 57 BPM | HEIGHT: 63 IN | DIASTOLIC BLOOD PRESSURE: 72 MMHG

## 2020-07-13 DIAGNOSIS — M25.512 CHRONIC LEFT SHOULDER PAIN: ICD-10-CM

## 2020-07-13 DIAGNOSIS — I10 ESSENTIAL HYPERTENSION: Primary | ICD-10-CM

## 2020-07-13 DIAGNOSIS — E78.2 MIXED HYPERLIPIDEMIA: ICD-10-CM

## 2020-07-13 DIAGNOSIS — G89.29 CHRONIC LEFT SHOULDER PAIN: ICD-10-CM

## 2020-07-13 PROCEDURE — 73030 XR SHOULDER COMPLETE 2 OR MORE VIEWS LEFT: ICD-10-PCS | Mod: 26,LT,, | Performed by: RADIOLOGY

## 2020-07-13 PROCEDURE — 99214 PR OFFICE/OUTPT VISIT, EST, LEVL IV, 30-39 MIN: ICD-10-PCS | Mod: S$PBB,,, | Performed by: FAMILY MEDICINE

## 2020-07-13 PROCEDURE — 99999 PR PBB SHADOW E&M-EST. PATIENT-LVL IV: CPT | Mod: PBBFAC,,, | Performed by: FAMILY MEDICINE

## 2020-07-13 PROCEDURE — 99214 OFFICE O/P EST MOD 30 MIN: CPT | Mod: PBBFAC,PO | Performed by: FAMILY MEDICINE

## 2020-07-13 PROCEDURE — 99999 PR PBB SHADOW E&M-EST. PATIENT-LVL IV: ICD-10-PCS | Mod: PBBFAC,,, | Performed by: FAMILY MEDICINE

## 2020-07-13 PROCEDURE — 73030 X-RAY EXAM OF SHOULDER: CPT | Mod: TC,FY,LT

## 2020-07-13 PROCEDURE — 73030 X-RAY EXAM OF SHOULDER: CPT | Mod: 26,LT,, | Performed by: RADIOLOGY

## 2020-07-13 PROCEDURE — 99214 OFFICE O/P EST MOD 30 MIN: CPT | Mod: S$PBB,,, | Performed by: FAMILY MEDICINE

## 2020-07-13 RX ORDER — METHYLPREDNISOLONE 4 MG/1
TABLET ORAL
Qty: 1 PACKAGE | Refills: 0 | Status: SHIPPED | OUTPATIENT
Start: 2020-07-13 | End: 2020-08-03

## 2020-07-13 NOTE — PROGRESS NOTES
Subjective:       Patient ID: Rigoberto Hughes is a 69 y.o. female.    Chief Complaint: Follow-up and Shoulder Pain (left)    69 yr old pleasant [place] female with hypertension, hyperlipidemia, osteoarthritis knee, obesity and no other significant medical history presents today for her follow up and c/o left shoulder pain x 1 month. She is unable to do certain activities. On NSAIDS OTC. No injury r trauma.    OA knee - had knee replacement done and feels much better - still has pain in her right knee which is not bad unless she palpates - her gait is improved after surgery       HTN - controlled - on coreg and losartan - compliant - no side effects       HLD - lab due - on statin - no side effect      History as below - reviewed       HM  -declined vaccines  -FIT test due  -mammo due    Follow-up  Associated symptoms include arthralgias, joint swelling, myalgias, neck pain and a rash. Pertinent negatives include no abdominal pain, anorexia, change in bowel habit, chest pain, chills, congestion, coughing, diaphoresis, fatigue, fever, headaches, nausea, numbness, sore throat, swollen glands, visual change, vomiting or weakness.   Shoulder Pain   The pain is present in the left shoulder. This is a chronic problem. The current episode started more than 1 month ago. There has been no history of extremity trauma. The problem occurs constantly. The problem has been unchanged. The quality of the pain is described as aching. The pain is at a severity of 7/10. The pain is severe. Pertinent negatives include no fever, headaches or numbness. The symptoms are aggravated by activity and contact. She has tried NSAIDS, heat and cold for the symptoms. The treatment provided moderate relief. Family history does not include arthritis. There is no history of diabetes or migraines.   Rash  This is a chronic problem. The current episode started 1 to 4 weeks ago. The problem is unchanged. The affected locations include the left arm,  right arm, right upper leg, right lower leg, left lower leg and left upper leg. The rash is characterized by burning, dryness and redness. She was exposed to nothing. Associated symptoms include joint pain. Pertinent negatives include no anorexia, congestion, cough, diarrhea, eye pain, facial edema, fatigue, fever, nail changes, rhinorrhea, shortness of breath, sore throat or vomiting. Past treatments include anti-itch cream. The treatment provided mild relief. There is no history of allergies, asthma, eczema or varicella.   Knee Pain   There was no injury mechanism. The pain is present in the right knee and left knee. The quality of the pain is described as aching. The pain is at a severity of 10/10. The pain is severe. The pain has been constant since onset. Pertinent negatives include no numbness. The symptoms are aggravated by movement, palpation and weight bearing. She has tried heat, ice, elevation, immobilization, NSAIDs and acetaminophen for the symptoms. The treatment provided no relief.   Edema  This is a new problem. The current episode started 1 to 4 weeks ago. The problem occurs constantly. The problem has been unchanged. Associated symptoms include arthralgias, joint swelling, myalgias, neck pain and a rash. Pertinent negatives include no abdominal pain, anorexia, change in bowel habit, chest pain, chills, congestion, coughing, diaphoresis, fatigue, fever, headaches, nausea, numbness, sore throat, swollen glands, visual change, vomiting or weakness. The symptoms are aggravated by walking and twisting. She has tried NSAIDs, sleep, rest, oral narcotics and heat for the symptoms. The treatment provided mild relief.   Hypertension  This is a chronic problem. The current episode started more than 1 month ago. The problem has been gradually improving since onset. The problem is controlled. Associated symptoms include neck pain. Pertinent negatives include no chest pain, headaches, palpitations or shortness  of breath. There are no associated agents to hypertension. Risk factors for coronary artery disease include obesity and post-menopausal state. Past treatments include angiotensin blockers and beta blockers. The current treatment provides significant improvement. There are no compliance problems.  There is no history of angina, CAD/MI, CVA, left ventricular hypertrophy or PVD. There is no history of chronic renal disease, coarctation of the aorta, hypercortisolism, a hypertension causing med, pheochromocytoma or sleep apnea.   Hyperlipidemia  This is a chronic problem. The current episode started more than 1 year ago. The problem is controlled. Recent lipid tests were reviewed and are normal. She has no history of chronic renal disease, diabetes, hypothyroidism, obesity or nephrotic syndrome. There are no known factors aggravating her hyperlipidemia. Associated symptoms include myalgias. Pertinent negatives include no chest pain or shortness of breath. The current treatment provides significant improvement of lipids. There are no compliance problems.  Risk factors for coronary artery disease include dyslipidemia and hypertension.     Review of Systems   Constitutional: Negative.  Negative for activity change, chills, diaphoresis, fatigue, fever and unexpected weight change.   HENT: Negative.  Negative for nasal congestion, ear discharge, hearing loss, rhinorrhea, sore throat, trouble swallowing and voice change.    Eyes: Negative.  Negative for pain, discharge and visual disturbance.   Respiratory: Negative.  Negative for cough, chest tightness, shortness of breath and wheezing.    Cardiovascular: Negative.  Negative for chest pain and palpitations.   Gastrointestinal: Negative.  Negative for abdominal distention, abdominal pain, anal bleeding, anorexia, blood in stool, change in bowel habit, constipation, diarrhea, nausea, vomiting and change in bowel habit.   Endocrine: Negative.  Negative for cold intolerance,  polydipsia and polyuria.   Genitourinary: Negative.  Negative for decreased urine volume, difficulty urinating, dysuria, frequency, hematuria, menstrual problem and vaginal pain.   Musculoskeletal: Positive for arthralgias, joint pain, joint swelling, myalgias and neck pain. Negative for gait problem.   Integumentary:  Positive for rash. Negative for color change, nail changes, pallor and wound.   Allergic/Immunologic: Negative.  Negative for environmental allergies and immunocompromised state.   Neurological: Negative.  Negative for dizziness, tremors, seizures, speech difficulty, weakness, numbness and headaches.   Hematological: Negative.  Negative for adenopathy. Does not bruise/bleed easily.   Psychiatric/Behavioral: Negative.  Negative for agitation, confusion, decreased concentration, dysphoric mood, hallucinations, self-injury and suicidal ideas. The patient is not nervous/anxious.          PMH/PSH/FH/SH/MED/ALLERGY reviewed    Objective:       Vitals:    07/13/20 1416   BP: 132/72   Pulse: (!) 57   Temp: 97.9 °F (36.6 °C)       Physical Exam  Constitutional:       General: She is not in acute distress.     Appearance: She is well-developed. She is not diaphoretic.   HENT:      Head: Normocephalic and atraumatic.      Right Ear: External ear normal.      Left Ear: External ear normal.      Nose: Nose normal.      Mouth/Throat:      Pharynx: No oropharyngeal exudate.   Eyes:      General: No scleral icterus.        Right eye: No discharge.         Left eye: No discharge.      Conjunctiva/sclera: Conjunctivae normal.      Pupils: Pupils are equal, round, and reactive to light.   Neck:      Musculoskeletal: Normal range of motion and neck supple.      Thyroid: No thyromegaly.      Vascular: No JVD.      Trachea: No tracheal deviation.   Cardiovascular:      Rate and Rhythm: Normal rate and regular rhythm.      Heart sounds: Normal heart sounds. No murmur. No friction rub. No gallop.    Pulmonary:      Effort:  Pulmonary effort is normal.      Breath sounds: Normal breath sounds. No stridor. No wheezing or rales.   Chest:      Chest wall: No tenderness.   Abdominal:      General: Bowel sounds are normal. There is no distension.      Palpations: Abdomen is soft. There is no mass.      Tenderness: There is no abdominal tenderness. There is no guarding or rebound.      Hernia: No hernia is present.   Musculoskeletal: Normal range of motion.         General: Tenderness (TTP left shoulder and painful ROM) present.   Lymphadenopathy:      Cervical: No cervical adenopathy.   Skin:     General: Skin is warm and dry.      Coloration: Skin is not pale.      Findings: No erythema or rash.   Neurological:      Mental Status: She is alert and oriented to person, place, and time.      Cranial Nerves: No cranial nerve deficit.      Motor: No abnormal muscle tone.      Coordination: Coordination normal.      Deep Tendon Reflexes: Reflexes are normal and symmetric. Reflexes normal.   Psychiatric:         Behavior: Behavior normal.         Thought Content: Thought content normal.         Judgment: Judgment normal.         Assessment:       1. Essential hypertension    2. Mixed hyperlipidemia    3. Chronic left shoulder pain        Plan:           Rigoberto was seen today for follow-up and shoulder pain.    Diagnoses and all orders for this visit:    Essential hypertension  -     Comprehensive metabolic panel; Future  -     Urinalysis; Future  -     Protein / creatinine ratio, urine; Future    Mixed hyperlipidemia  -     Comprehensive metabolic panel; Future    Chronic left shoulder pain  -     methylPREDNISolone (MEDROL DOSEPACK) 4 mg tablet; use as directed  -     Cancel: X-ray Shoulder 2 or More Views Right; Future      HTN  -controlled    Left shoulder pain  -D tendinitis  -rest, medrol dose pack    HLD  -controlled    Renal insufficiency  -lab repeat  -fluids    Spent adequate time in obtaining history and explaining differentials    25  minutes spent during this visit of which greater than 50% devoted to face-face counseling and coordination of care regarding diagnosis and management plan    Follow up in about 3 months (around 10/13/2020), or if symptoms worsen or fail to improve.

## 2020-10-05 ENCOUNTER — PATIENT MESSAGE (OUTPATIENT)
Dept: ADMINISTRATIVE | Facility: HOSPITAL | Age: 70
End: 2020-10-05

## 2021-01-04 ENCOUNTER — PATIENT MESSAGE (OUTPATIENT)
Dept: ADMINISTRATIVE | Facility: HOSPITAL | Age: 71
End: 2021-01-04

## 2021-01-10 ENCOUNTER — IMMUNIZATION (OUTPATIENT)
Dept: PRIMARY CARE CLINIC | Facility: CLINIC | Age: 71
End: 2021-01-10
Payer: MEDICAID

## 2021-01-10 DIAGNOSIS — Z23 NEED FOR VACCINATION: ICD-10-CM

## 2021-01-10 PROCEDURE — 0001A COVID-19, MRNA, LNP-S, PF, 30 MCG/0.3 ML DOSE VACCINE: ICD-10-PCS | Mod: S$GLB,,, | Performed by: FAMILY MEDICINE

## 2021-01-10 PROCEDURE — 91300 COVID-19, MRNA, LNP-S, PF, 30 MCG/0.3 ML DOSE VACCINE: CPT | Mod: S$GLB,,, | Performed by: FAMILY MEDICINE

## 2021-01-10 PROCEDURE — 91300 COVID-19, MRNA, LNP-S, PF, 30 MCG/0.3 ML DOSE VACCINE: ICD-10-PCS | Mod: S$GLB,,, | Performed by: FAMILY MEDICINE

## 2021-01-10 PROCEDURE — 0001A COVID-19, MRNA, LNP-S, PF, 30 MCG/0.3 ML DOSE VACCINE: CPT | Mod: S$GLB,,, | Performed by: FAMILY MEDICINE

## 2021-01-29 ENCOUNTER — PATIENT MESSAGE (OUTPATIENT)
Dept: ADMINISTRATIVE | Facility: HOSPITAL | Age: 71
End: 2021-01-29

## 2021-01-31 ENCOUNTER — IMMUNIZATION (OUTPATIENT)
Dept: PRIMARY CARE CLINIC | Facility: CLINIC | Age: 71
End: 2021-01-31
Payer: MEDICAID

## 2021-01-31 DIAGNOSIS — Z23 NEED FOR VACCINATION: Primary | ICD-10-CM

## 2021-01-31 PROCEDURE — 0002A COVID-19, MRNA, LNP-S, PF, 30 MCG/0.3 ML DOSE VACCINE: ICD-10-PCS | Mod: S$GLB,,, | Performed by: FAMILY MEDICINE

## 2021-01-31 PROCEDURE — 0002A COVID-19, MRNA, LNP-S, PF, 30 MCG/0.3 ML DOSE VACCINE: CPT | Mod: S$GLB,,, | Performed by: FAMILY MEDICINE

## 2021-01-31 PROCEDURE — 91300 COVID-19, MRNA, LNP-S, PF, 30 MCG/0.3 ML DOSE VACCINE: CPT | Mod: S$GLB,,, | Performed by: FAMILY MEDICINE

## 2021-01-31 PROCEDURE — 91300 COVID-19, MRNA, LNP-S, PF, 30 MCG/0.3 ML DOSE VACCINE: ICD-10-PCS | Mod: S$GLB,,, | Performed by: FAMILY MEDICINE

## 2021-02-01 ENCOUNTER — PATIENT MESSAGE (OUTPATIENT)
Dept: FAMILY MEDICINE | Facility: CLINIC | Age: 71
End: 2021-02-01

## 2021-02-01 DIAGNOSIS — E78.2 MIXED HYPERLIPIDEMIA: ICD-10-CM

## 2021-02-01 DIAGNOSIS — I10 ESSENTIAL HYPERTENSION: ICD-10-CM

## 2021-02-02 RX ORDER — SIMVASTATIN 10 MG/1
10 TABLET, FILM COATED ORAL NIGHTLY
Qty: 90 TABLET | Refills: 3 | Status: SHIPPED | OUTPATIENT
Start: 2021-02-02

## 2021-02-02 RX ORDER — CARVEDILOL 3.12 MG/1
TABLET ORAL
Qty: 180 TABLET | Refills: 3 | Status: SHIPPED | OUTPATIENT
Start: 2021-02-02 | End: 2022-03-08

## 2021-02-02 RX ORDER — LOSARTAN POTASSIUM 100 MG/1
TABLET ORAL
Qty: 90 TABLET | Refills: 4 | Status: SHIPPED | OUTPATIENT
Start: 2021-02-02

## 2021-03-10 DIAGNOSIS — Z12.11 COLON CANCER SCREENING: ICD-10-CM

## 2021-04-01 ENCOUNTER — PATIENT MESSAGE (OUTPATIENT)
Dept: ADMINISTRATIVE | Facility: HOSPITAL | Age: 71
End: 2021-04-01

## 2021-05-19 DIAGNOSIS — Z12.31 OTHER SCREENING MAMMOGRAM: ICD-10-CM

## 2021-07-06 ENCOUNTER — PATIENT MESSAGE (OUTPATIENT)
Dept: ADMINISTRATIVE | Facility: HOSPITAL | Age: 71
End: 2021-07-06

## 2021-08-15 ENCOUNTER — PATIENT OUTREACH (OUTPATIENT)
Dept: ADMINISTRATIVE | Facility: OTHER | Age: 71
End: 2021-08-15

## 2021-08-16 ENCOUNTER — OFFICE VISIT (OUTPATIENT)
Dept: OPHTHALMOLOGY | Facility: CLINIC | Age: 71
End: 2021-08-16
Payer: MEDICAID

## 2021-08-16 DIAGNOSIS — H52.7 REFRACTIVE ERRORS: ICD-10-CM

## 2021-08-16 DIAGNOSIS — H25.13 CATARACT, NUCLEAR SCLEROTIC SENILE, BILATERAL: Primary | ICD-10-CM

## 2021-08-16 PROCEDURE — 92004 PR EYE EXAM, NEW PATIENT,COMPREHESV: ICD-10-PCS | Mod: S$PBB,,, | Performed by: OPTOMETRIST

## 2021-08-16 PROCEDURE — 92015 DETERMINE REFRACTIVE STATE: CPT | Mod: ,,, | Performed by: OPTOMETRIST

## 2021-08-16 PROCEDURE — 92004 COMPRE OPH EXAM NEW PT 1/>: CPT | Mod: S$PBB,,, | Performed by: OPTOMETRIST

## 2021-08-16 PROCEDURE — 92015 PR REFRACTION: ICD-10-PCS | Mod: ,,, | Performed by: OPTOMETRIST

## 2021-09-29 ENCOUNTER — PATIENT MESSAGE (OUTPATIENT)
Dept: FAMILY MEDICINE | Facility: CLINIC | Age: 71
End: 2021-09-29

## 2021-10-04 ENCOUNTER — PATIENT MESSAGE (OUTPATIENT)
Dept: ADMINISTRATIVE | Facility: HOSPITAL | Age: 71
End: 2021-10-04

## 2021-10-05 ENCOUNTER — IMMUNIZATION (OUTPATIENT)
Dept: PRIMARY CARE CLINIC | Facility: CLINIC | Age: 71
End: 2021-10-05
Payer: MEDICAID

## 2021-10-05 DIAGNOSIS — Z23 NEED FOR VACCINATION: Primary | ICD-10-CM

## 2021-10-05 PROCEDURE — 0003A COVID-19, MRNA, LNP-S, PF, 30 MCG/0.3 ML DOSE VACCINE: CPT | Mod: CV19,PBBFAC | Performed by: FAMILY MEDICINE

## 2021-10-05 PROCEDURE — 91300 COVID-19, MRNA, LNP-S, PF, 30 MCG/0.3 ML DOSE VACCINE: CPT | Mod: PBBFAC | Performed by: FAMILY MEDICINE

## 2022-01-10 ENCOUNTER — PATIENT MESSAGE (OUTPATIENT)
Dept: ADMINISTRATIVE | Facility: HOSPITAL | Age: 72
End: 2022-01-10
Payer: MEDICAID

## 2022-03-07 DIAGNOSIS — I10 ESSENTIAL HYPERTENSION: ICD-10-CM

## 2022-03-07 NOTE — TELEPHONE ENCOUNTER
Care Due:                  Date            Visit Type   Department     Provider  --------------------------------------------------------------------------------                                ESTABLISHED   [facility]    Austin Silverio  Last Visit: 07-      PATIENT      MEDICINE       Jared  Next Visit: None Scheduled  None         None Found                                                            Last  Test          Frequency    Reason                     Performed    Due Date  --------------------------------------------------------------------------------    Office Visit  12 months..  losartan, simvastatin....  07- 07-    CMP.........  12 months..  losartan, simvastatin....  07- 07-    Lipid Panel.  12 months..  simvastatin..............  Not Found    Overdue    Powered by [employer]. Reference number: [id number].   3/07/2022 1:31:04 PM CST

## 2022-03-08 RX ORDER — CARVEDILOL 3.12 MG/1
TABLET ORAL
Qty: 180 TABLET | Refills: 3 | Status: SHIPPED | OUTPATIENT
Start: 2022-03-08 | End: 2023-03-13

## 2022-03-16 DIAGNOSIS — Z12.11 COLON CANCER SCREENING: ICD-10-CM

## 2022-03-21 ENCOUNTER — PATIENT MESSAGE (OUTPATIENT)
Dept: ADMINISTRATIVE | Facility: HOSPITAL | Age: 72
End: 2022-03-21
Payer: MEDICAID

## 2022-05-31 ENCOUNTER — PATIENT MESSAGE (OUTPATIENT)
Dept: ADMINISTRATIVE | Facility: HOSPITAL | Age: 72
End: 2022-05-31
Payer: MEDICAID

## 2022-09-15 ENCOUNTER — OFFICE VISIT (OUTPATIENT)
Dept: OPHTHALMOLOGY | Facility: CLINIC | Age: 72
End: 2022-09-15
Payer: MEDICAID

## 2022-09-15 DIAGNOSIS — H25.13 CATARACT, NUCLEAR SCLEROTIC SENILE, BILATERAL: ICD-10-CM

## 2022-09-15 DIAGNOSIS — H01.02A SQUAMOUS BLEPHARITIS OF UPPER AND LOWER EYELIDS OF BOTH EYES: Primary | ICD-10-CM

## 2022-09-15 DIAGNOSIS — H01.02B SQUAMOUS BLEPHARITIS OF UPPER AND LOWER EYELIDS OF BOTH EYES: Primary | ICD-10-CM

## 2022-09-15 PROCEDURE — 99999 PR PBB SHADOW E&M-EST. PATIENT-LVL II: CPT | Mod: PBBFAC,,, | Performed by: OPTOMETRIST

## 2022-09-15 PROCEDURE — 99999 PR PBB SHADOW E&M-EST. PATIENT-LVL II: ICD-10-PCS | Mod: PBBFAC,,, | Performed by: OPTOMETRIST

## 2022-09-15 PROCEDURE — 1159F MED LIST DOCD IN RCRD: CPT | Mod: CPTII,,, | Performed by: OPTOMETRIST

## 2022-09-15 PROCEDURE — 4010F ACE/ARB THERAPY RXD/TAKEN: CPT | Mod: CPTII,,, | Performed by: OPTOMETRIST

## 2022-09-15 PROCEDURE — 92014 PR EYE EXAM, EST PATIENT,COMPREHESV: ICD-10-PCS | Mod: S$PBB,,, | Performed by: OPTOMETRIST

## 2022-09-15 PROCEDURE — 92014 COMPRE OPH EXAM EST PT 1/>: CPT | Mod: S$PBB,,, | Performed by: OPTOMETRIST

## 2022-09-15 PROCEDURE — 1159F PR MEDICATION LIST DOCUMENTED IN MEDICAL RECORD: ICD-10-PCS | Mod: CPTII,,, | Performed by: OPTOMETRIST

## 2022-09-15 PROCEDURE — 4010F PR ACE/ARB THEARPY RXD/TAKEN: ICD-10-PCS | Mod: CPTII,,, | Performed by: OPTOMETRIST

## 2022-09-15 PROCEDURE — 99212 OFFICE O/P EST SF 10 MIN: CPT | Mod: PBBFAC | Performed by: OPTOMETRIST

## 2022-09-15 RX ORDER — METFORMIN HYDROCHLORIDE 500 MG/1
500 TABLET, EXTENDED RELEASE ORAL
COMMUNITY
Start: 2022-03-09

## 2022-09-15 RX ORDER — NEOMYCIN SULFATE, POLYMYXIN B SULFATE AND DEXAMETHASONE 3.5; 10000; 1 MG/ML; [USP'U]/ML; MG/ML
1 SUSPENSION/ DROPS OPHTHALMIC 4 TIMES DAILY
Qty: 10 ML | Refills: 0 | Status: SHIPPED | OUTPATIENT
Start: 2022-09-15 | End: 2022-09-25

## 2022-09-15 RX ORDER — AMLODIPINE BESYLATE 5 MG/1
1 TABLET ORAL DAILY
COMMUNITY
Start: 2022-03-09

## 2022-09-15 NOTE — PROGRESS NOTES
HPI    Left feels like something is in eye x 1 month.  Last  eye exam 08/16/2021 [facility].  Update glasses RX.  Last edited by Jennifer Crain MA on 9/15/2022  3:29 PM.            Assessment /Plan     For exam results, see Encounter Report.    Squamous blepharitis of upper and lower eyelids of both eyes  -     neomycin-polymyxin-dexamethasone (MAXITROL) 3.5mg/mL-10,000 unit/mL-0.1 % DrpS; Place 1 drop into both eyes 4 (four) times daily. for 10 days  Dispense: 10 mL; Refill: 0    Cataract, nuclear sclerotic senile, bilateral    Diffused SPK vs NS causing decreased Va   Will check refraction once blepharitis infection clears    RTC refraction and cataracts

## 2022-09-30 ENCOUNTER — TELEPHONE (OUTPATIENT)
Dept: OPHTHALMOLOGY | Facility: CLINIC | Age: 72
End: 2022-09-30
Payer: MEDICAID

## 2022-09-30 DIAGNOSIS — H01.009 BLEPHARITIS, UNSPECIFIED LATERALITY, UNSPECIFIED TYPE: Primary | ICD-10-CM

## 2022-09-30 RX ORDER — NEOMYCIN SULFATE, POLYMYXIN B SULFATE, AND DEXAMETHASONE 3.5; 10000; 1 MG/G; [USP'U]/G; MG/G
OINTMENT OPHTHALMIC EVERY 6 HOURS
OUTPATIENT
Start: 2022-09-30

## 2022-09-30 RX ORDER — ERYTHROMYCIN 5 MG/G
OINTMENT OPHTHALMIC 3 TIMES DAILY
Qty: 1 EACH | Refills: 4 | Status: SHIPPED | OUTPATIENT
Start: 2022-09-30 | End: 2023-08-23

## 2022-09-30 RX ORDER — NEOMYCIN SULFATE, POLYMYXIN B SULFATE, AND DEXAMETHASONE 3.5; 10000; 1 MG/G; [USP'U]/G; MG/G
OINTMENT OPHTHALMIC EVERY 6 HOURS
COMMUNITY
End: 2022-09-30

## 2022-09-30 NOTE — TELEPHONE ENCOUNTER
----- Message from Yolanda Richardson sent at 9/30/2022  1:14 PM CDT -----  Contact: Sujit(Son)/[phone]  Sujit is calling in regards to pt, he states pt is saying that her vision is getting worse very blurry. Please give him a call back at [phone]. Thank you s/g

## 2022-09-30 NOTE — TELEPHONE ENCOUNTER
I called and spoke to patients son Sujit he said they finished the Maxitrol Drops and eye is still bothering her he is picking up another rx or refill tonight but she is also complaining of very blurry vision I told him I would speak to Dr Spencer and give him a call back.      ----- Message from Yolanda Richardson sent at 9/30/2022  1:14 PM CDT -----  Contact: Sujit(Son)/[phone]  Sujit is calling in regards to pt, he states pt is saying that her vision is getting worse very blurry. Please give him a call back at [phone]. Thank you s/g

## 2022-09-30 NOTE — TELEPHONE ENCOUNTER
----- Message from Yolanda Richardson sent at 9/30/2022  1:14 PM CDT -----  Contact: Sujit(Son)/[phone]  Sujit is calling in regards to pt, he states pt is saying that her vision is getting worse very blurry. Please give him a call back at [phone]. Thank you s/g

## 2022-10-19 ENCOUNTER — OFFICE VISIT (OUTPATIENT)
Dept: OPHTHALMOLOGY | Facility: CLINIC | Age: 72
End: 2022-10-19
Payer: MEDICAID

## 2022-10-19 DIAGNOSIS — H04.123 DRY EYES, BILATERAL: ICD-10-CM

## 2022-10-19 DIAGNOSIS — H25.13 CATARACT, NUCLEAR SCLEROTIC SENILE, BILATERAL: Primary | ICD-10-CM

## 2022-10-19 PROCEDURE — 92012 PR EYE EXAM, EST PATIENT,INTERMED: ICD-10-PCS | Mod: S$PBB,,, | Performed by: OPTOMETRIST

## 2022-10-19 PROCEDURE — 4010F PR ACE/ARB THEARPY RXD/TAKEN: ICD-10-PCS | Mod: CPTII,,, | Performed by: OPTOMETRIST

## 2022-10-19 PROCEDURE — 1159F MED LIST DOCD IN RCRD: CPT | Mod: CPTII,,, | Performed by: OPTOMETRIST

## 2022-10-19 PROCEDURE — 4010F ACE/ARB THERAPY RXD/TAKEN: CPT | Mod: CPTII,,, | Performed by: OPTOMETRIST

## 2022-10-19 PROCEDURE — 92012 INTRM OPH EXAM EST PATIENT: CPT | Mod: S$PBB,,, | Performed by: OPTOMETRIST

## 2022-10-19 PROCEDURE — 1159F PR MEDICATION LIST DOCUMENTED IN MEDICAL RECORD: ICD-10-PCS | Mod: CPTII,,, | Performed by: OPTOMETRIST

## 2022-10-19 NOTE — PROGRESS NOTES
HPI    Here for refraction and cataract check.    Maxitrol drops qid OU   Erythromycin ointment prescribed after phone call tid OU    Still has FB sens, drops burning eyes, now vision worse.  Last edited by Heather Spears MA on 10/19/2022  2:51 PM.            Assessment /Plan     For exam results, see Encounter Report.    Cataract, nuclear sclerotic senile, bilateral    Dry eyes, bilateral      Improved blepharitis  D/C maxitrol gtts  Continue emycin edwin bid    4+ diffuse keratitis OU  Start Refresh gel qid    Consult Dr Beverly for cataract eval                  No

## 2022-10-19 NOTE — Clinical Note
This [facility] patient needs a new PCP since moving to [place]. Can you find a medicaid spot for her in the next few months?

## 2022-10-21 ENCOUNTER — TELEPHONE (OUTPATIENT)
Dept: OPHTHALMOLOGY | Facility: CLINIC | Age: 72
End: 2022-10-21
Payer: MEDICAID

## 2022-10-26 ENCOUNTER — PATIENT MESSAGE (OUTPATIENT)
Dept: OPHTHALMOLOGY | Facility: CLINIC | Age: 72
End: 2022-10-26
Payer: MEDICAID

## 2022-12-14 ENCOUNTER — OFFICE VISIT (OUTPATIENT)
Dept: OPHTHALMOLOGY | Facility: CLINIC | Age: 72
End: 2022-12-14
Payer: MEDICAID

## 2022-12-14 DIAGNOSIS — H01.02B SQUAMOUS BLEPHARITIS OF UPPER AND LOWER EYELIDS OF BOTH EYES: ICD-10-CM

## 2022-12-14 DIAGNOSIS — H01.02A SQUAMOUS BLEPHARITIS OF UPPER AND LOWER EYELIDS OF BOTH EYES: ICD-10-CM

## 2022-12-14 DIAGNOSIS — H04.123 DRY EYES, BILATERAL: Primary | ICD-10-CM

## 2022-12-14 DIAGNOSIS — H40.003 GLAUCOMA SUSPECT OF BOTH EYES: ICD-10-CM

## 2022-12-14 DIAGNOSIS — H25.12 NUCLEAR SCLEROSIS OF LEFT EYE: ICD-10-CM

## 2022-12-14 DIAGNOSIS — H25.11 NUCLEAR SCLEROSIS OF RIGHT EYE: ICD-10-CM

## 2022-12-14 PROBLEM — R73.03 PREDIABETES: Status: ACTIVE | Noted: 2022-11-02

## 2022-12-14 PROBLEM — N18.32 STAGE 3B CHRONIC KIDNEY DISEASE: Status: ACTIVE | Noted: 2022-11-02

## 2022-12-14 PROCEDURE — 99999 PR PBB SHADOW E&M-EST. PATIENT-LVL III: CPT | Mod: PBBFAC,,, | Performed by: OPHTHALMOLOGY

## 2022-12-14 PROCEDURE — 99203 OFFICE O/P NEW LOW 30 MIN: CPT | Mod: S$PBB,,, | Performed by: OPHTHALMOLOGY

## 2022-12-14 PROCEDURE — 4010F PR ACE/ARB THEARPY RXD/TAKEN: ICD-10-PCS | Mod: CPTII,,, | Performed by: OPHTHALMOLOGY

## 2022-12-14 PROCEDURE — 4010F ACE/ARB THERAPY RXD/TAKEN: CPT | Mod: CPTII,,, | Performed by: OPHTHALMOLOGY

## 2022-12-14 PROCEDURE — 1160F RVW MEDS BY RX/DR IN RCRD: CPT | Mod: CPTII,,, | Performed by: OPHTHALMOLOGY

## 2022-12-14 PROCEDURE — 99213 OFFICE O/P EST LOW 20 MIN: CPT | Mod: PBBFAC | Performed by: OPHTHALMOLOGY

## 2022-12-14 PROCEDURE — 92133 CPTRZD OPH DX IMG PST SGM ON: CPT | Mod: PBBFAC | Performed by: OPHTHALMOLOGY

## 2022-12-14 PROCEDURE — 1159F PR MEDICATION LIST DOCUMENTED IN MEDICAL RECORD: ICD-10-PCS | Mod: CPTII,,, | Performed by: OPHTHALMOLOGY

## 2022-12-14 PROCEDURE — 1160F PR REVIEW ALL MEDS BY PRESCRIBER/CLIN PHARMACIST DOCUMENTED: ICD-10-PCS | Mod: CPTII,,, | Performed by: OPHTHALMOLOGY

## 2022-12-14 PROCEDURE — 92133 POSTERIOR SEGMENT OCT OPTIC NERVE(OCULAR COHERENCE TOMOGRAPHY) - OU - BOTH EYES: ICD-10-PCS | Mod: 26,S$PBB,, | Performed by: OPHTHALMOLOGY

## 2022-12-14 PROCEDURE — 99203 PR OFFICE/OUTPT VISIT, NEW, LEVL III, 30-44 MIN: ICD-10-PCS | Mod: S$PBB,,, | Performed by: OPHTHALMOLOGY

## 2022-12-14 PROCEDURE — 1159F MED LIST DOCD IN RCRD: CPT | Mod: CPTII,,, | Performed by: OPHTHALMOLOGY

## 2022-12-14 PROCEDURE — 99999 PR PBB SHADOW E&M-EST. PATIENT-LVL III: ICD-10-PCS | Mod: PBBFAC,,, | Performed by: OPHTHALMOLOGY

## 2022-12-14 NOTE — PROGRESS NOTES
HPI     Cataract            Comments: Ref by TRF          Comments    Patient states that va is very blurry OU with current glasses - Dr Spencer   referred pt for cat eval and eval of dry eye - patient has pains ou   occasionally        Cataract, nuclear sclerotic senile, bilateral  Dry eyes, bilateral          Last edited by Ammy Epstein MA on 12/14/2022  1:54 PM.            Assessment /Plan     For exam results, see Encounter Report.      ICD-10-CM ICD-9-CM    1. Dry eyes, bilateral  H04.123 375.15 Findings and symptoms consistent with advanced dry eyes.     Recommend:    Systane Hydration and Refresh Optive Go 3: 4 times per day each or as able.  Genteal Gel or Systane gel in a tube at bedtime                2. Nuclear sclerosis of left eye  H25.12 366.16 Visually significant, yet due to very significant dryness, will follow now and address Dryness first - will need repeat ASCAN Prior sx   Would be BLOCK  Irregular mires on [place] today       3. Nuclear sclerosis of right eye  H25.11 366.16 See above       4. Squamous blepharitis of upper and lower eyelids of both eyes  H01.02A 373.02 Stable- follow , use warm compresses   Treat dry eye aggressively at this time     H01.02B          5.  Glaucoma Suspect --   warrants further f/u after [place], difficult view due to dryness, cataracts. Low TS OD noted on gOCT and reduced sup corresponding Gcl OD          RETURN TO CLINIC 3-5 weeks   andreea dry ness, IOP  and will repeat ASCAN then if cleared

## 2023-01-25 ENCOUNTER — OFFICE VISIT (OUTPATIENT)
Dept: OPHTHALMOLOGY | Facility: CLINIC | Age: 73
End: 2023-01-25
Payer: MEDICAID

## 2023-01-25 DIAGNOSIS — H25.12 NUCLEAR SCLEROSIS OF LEFT EYE: ICD-10-CM

## 2023-01-25 DIAGNOSIS — H04.123 DRY EYES, BILATERAL: Primary | ICD-10-CM

## 2023-01-25 DIAGNOSIS — H25.11 NUCLEAR SCLEROSIS OF RIGHT EYE: ICD-10-CM

## 2023-01-25 PROCEDURE — 99213 OFFICE O/P EST LOW 20 MIN: CPT | Mod: S$PBB,,, | Performed by: OPHTHALMOLOGY

## 2023-01-25 PROCEDURE — 1126F AMNT PAIN NOTED NONE PRSNT: CPT | Mod: CPTII,,, | Performed by: OPHTHALMOLOGY

## 2023-01-25 PROCEDURE — 1159F PR MEDICATION LIST DOCUMENTED IN MEDICAL RECORD: ICD-10-PCS | Mod: CPTII,,, | Performed by: OPHTHALMOLOGY

## 2023-01-25 PROCEDURE — 1126F PR PAIN SEVERITY QUANTIFIED, NO PAIN PRESENT: ICD-10-PCS | Mod: CPTII,,, | Performed by: OPHTHALMOLOGY

## 2023-01-25 PROCEDURE — 99213 PR OFFICE/OUTPT VISIT, EST, LEVL III, 20-29 MIN: ICD-10-PCS | Mod: S$PBB,,, | Performed by: OPHTHALMOLOGY

## 2023-01-25 PROCEDURE — 1159F MED LIST DOCD IN RCRD: CPT | Mod: CPTII,,, | Performed by: OPHTHALMOLOGY

## 2023-01-25 PROCEDURE — 99213 OFFICE O/P EST LOW 20 MIN: CPT | Mod: PBBFAC | Performed by: OPHTHALMOLOGY

## 2023-01-25 PROCEDURE — 99999 PR PBB SHADOW E&M-EST. PATIENT-LVL III: CPT | Mod: PBBFAC,,, | Performed by: OPHTHALMOLOGY

## 2023-01-25 PROCEDURE — 99999 PR PBB SHADOW E&M-EST. PATIENT-LVL III: ICD-10-PCS | Mod: PBBFAC,,, | Performed by: OPHTHALMOLOGY

## 2023-01-25 NOTE — PROGRESS NOTES
HPI     Dry Eye            Comments: 1 month recheck for dry eyes. VA is doing well. No pain or   irritation. Only At's gtt and ointment.          Comments    Cataract, nuclear sclerotic senile, bilateral   Dry eyes, bilateral      OTC Tears and Tear Gel            Last edited by Patrick Cruz on 1/25/2023  2:27 PM.            Assessment /Plan     For exam results, see Encounter Report.      ICD-10-CM ICD-9-CM    1. Dry eyes, bilateral  H04.123 375.15 Significant on exam   Continue daily dry eye treatment with gel and drops       2. Nuclear sclerosis of left eye  H25.12 366.16 Follow at this time       3. Nuclear sclerosis of right eye  H25.11 366.16 Follow at this time           Continue Dry eye treatments   RETURN TO CLINIC 6 months doa

## 2023-07-26 ENCOUNTER — OFFICE VISIT (OUTPATIENT)
Dept: OPHTHALMOLOGY | Facility: CLINIC | Age: 73
End: 2023-07-26
Payer: MEDICAID

## 2023-07-26 DIAGNOSIS — H25.12 NUCLEAR SCLEROSIS OF LEFT EYE: ICD-10-CM

## 2023-07-26 DIAGNOSIS — H25.11 NUCLEAR SCLEROSIS OF RIGHT EYE: ICD-10-CM

## 2023-07-26 DIAGNOSIS — H04.123 DRY EYES, BILATERAL: Primary | ICD-10-CM

## 2023-07-26 PROCEDURE — 99999 PR PBB SHADOW E&M-EST. PATIENT-LVL III: CPT | Mod: PBBFAC,,, | Performed by: OPHTHALMOLOGY

## 2023-07-26 PROCEDURE — 99214 PR OFFICE/OUTPT VISIT, EST, LEVL IV, 30-39 MIN: ICD-10-PCS | Mod: S$PBB,,, | Performed by: OPHTHALMOLOGY

## 2023-07-26 PROCEDURE — 99213 OFFICE O/P EST LOW 20 MIN: CPT | Mod: PBBFAC | Performed by: OPHTHALMOLOGY

## 2023-07-26 PROCEDURE — 99999 PR PBB SHADOW E&M-EST. PATIENT-LVL III: ICD-10-PCS | Mod: PBBFAC,,, | Performed by: OPHTHALMOLOGY

## 2023-07-26 PROCEDURE — 99214 OFFICE O/P EST MOD 30 MIN: CPT | Mod: S$PBB,,, | Performed by: OPHTHALMOLOGY

## 2023-07-26 PROCEDURE — 1159F PR MEDICATION LIST DOCUMENTED IN MEDICAL RECORD: ICD-10-PCS | Mod: CPTII,,, | Performed by: OPHTHALMOLOGY

## 2023-07-26 PROCEDURE — 1159F MED LIST DOCD IN RCRD: CPT | Mod: CPTII,,, | Performed by: OPHTHALMOLOGY

## 2023-07-26 PROCEDURE — 1160F PR REVIEW ALL MEDS BY PRESCRIBER/CLIN PHARMACIST DOCUMENTED: ICD-10-PCS | Mod: CPTII,,, | Performed by: OPHTHALMOLOGY

## 2023-07-26 PROCEDURE — 1160F RVW MEDS BY RX/DR IN RCRD: CPT | Mod: CPTII,,, | Performed by: OPHTHALMOLOGY

## 2023-07-26 RX ORDER — CEVIMELINE HYDROCHLORIDE 30 MG/1
30 CAPSULE ORAL 3 TIMES DAILY
Qty: 90 CAPSULE | Refills: 11 | Status: SHIPPED | OUTPATIENT
Start: 2023-07-26 | End: 2024-07-25

## 2023-07-26 NOTE — PROGRESS NOTES
HPI     Cataract            Comments: 6m Dil          Comments    Patient states that glasses are about 6m old and are working well for   needs - using OTC Tears TID and Gel QHS -  states some itching of OD in   last few days        Cataract, nuclear sclerotic senile, bilateral   Dry eyes, bilateral      OTC Tears and Tear Gel            Last edited by Ammy Epstein MA on 7/26/2023  2:31 PM.            Assessment /Plan     For exam results, see Encounter Report.      ICD-10-CM ICD-9-CM    1. Dry eyes, bilateral  H04.123 375.15 Findings and symptoms consistent with advanced dry eyes.     Recommend:  Evoxac PO      2. Nuclear sclerosis of left eye  H25.12 366.16 Not visually significant, monitor       3. Nuclear sclerosis of right eye  H25.11 366.16 As above           Return to clinic 2 months for dry eye check      OTC Tears and Tear Gel  Evoxac PO

## 2023-08-23 ENCOUNTER — OFFICE VISIT (OUTPATIENT)
Dept: OPHTHALMOLOGY | Facility: CLINIC | Age: 73
End: 2023-08-23
Payer: MEDICAID

## 2023-08-23 DIAGNOSIS — H18.49 CORNEAL DELLEN OF RIGHT EYE: Primary | ICD-10-CM

## 2023-08-23 PROCEDURE — 99213 PR OFFICE/OUTPT VISIT, EST, LEVL III, 20-29 MIN: ICD-10-PCS | Mod: S$PBB,,, | Performed by: OPTOMETRIST

## 2023-08-23 PROCEDURE — 1160F PR REVIEW ALL MEDS BY PRESCRIBER/CLIN PHARMACIST DOCUMENTED: ICD-10-PCS | Mod: CPTII,,, | Performed by: OPTOMETRIST

## 2023-08-23 PROCEDURE — 99999 PR PBB SHADOW E&M-EST. PATIENT-LVL III: CPT | Mod: PBBFAC,,, | Performed by: OPTOMETRIST

## 2023-08-23 PROCEDURE — 99213 OFFICE O/P EST LOW 20 MIN: CPT | Mod: PBBFAC | Performed by: OPTOMETRIST

## 2023-08-23 PROCEDURE — 99999 PR PBB SHADOW E&M-EST. PATIENT-LVL III: ICD-10-PCS | Mod: PBBFAC,,, | Performed by: OPTOMETRIST

## 2023-08-23 PROCEDURE — 1159F MED LIST DOCD IN RCRD: CPT | Mod: CPTII,,, | Performed by: OPTOMETRIST

## 2023-08-23 PROCEDURE — 1125F AMNT PAIN NOTED PAIN PRSNT: CPT | Mod: CPTII,,, | Performed by: OPTOMETRIST

## 2023-08-23 PROCEDURE — 99213 OFFICE O/P EST LOW 20 MIN: CPT | Mod: S$PBB,,, | Performed by: OPTOMETRIST

## 2023-08-23 PROCEDURE — 1125F PR PAIN SEVERITY QUANTIFIED, PAIN PRESENT: ICD-10-PCS | Mod: CPTII,,, | Performed by: OPTOMETRIST

## 2023-08-23 PROCEDURE — 1159F PR MEDICATION LIST DOCUMENTED IN MEDICAL RECORD: ICD-10-PCS | Mod: CPTII,,, | Performed by: OPTOMETRIST

## 2023-08-23 PROCEDURE — 1160F RVW MEDS BY RX/DR IN RCRD: CPT | Mod: CPTII,,, | Performed by: OPTOMETRIST

## 2023-08-23 RX ORDER — MOXIFLOXACIN 5 MG/ML
1 SOLUTION/ DROPS OPHTHALMIC
Qty: 3 ML | Status: SHIPPED | OUTPATIENT
Start: 2023-08-23 | End: 2023-08-30

## 2023-08-23 RX ORDER — ERYTHROMYCIN 5 MG/G
OINTMENT OPHTHALMIC EVERY 4 HOURS
Qty: 3.5 G | Refills: 2 | Status: SHIPPED | OUTPATIENT
Start: 2023-08-23

## 2023-08-23 NOTE — PROGRESS NOTES
HPI     Eye Pain            Comments: Couple days ago having red eyes and pain in right eye. Not sure   the caused it suddenly had pain and red. Still having pain upper eyeball.   Patient is on Genteal, Systane and Ivizia OTC gtts.          Comments    Cataract, nuclear sclerotic senile, bilateral   Dry eyes, bilateral      OTC Tears and Tear Gel            Last edited by Patrick Cruz on 8/23/2023  9:28 AM.            Assessment /Plan     For exam results, see Encounter Report.    Corneal dellen of right eye  -     erythromycin (ROMYCIN) ophthalmic ointment; Place into the right eye every 4 (four) hours.  Dispense: 3.5 g; Refill: 2  -     moxifloxacin (VIGAMOX) 0.5 % ophthalmic solution; Place 1 drop into the right eye every 2 (two) hours. for 7 days  Dispense: 3 mL; Refill: ML      Start Vigamox q2hr while awake and Erythromycin q4hr while awake   Consider adding steroid at f/u    RTC 2 days for follow up or PRN if any problems.   Discussed above and answered questions.

## 2023-08-25 ENCOUNTER — OFFICE VISIT (OUTPATIENT)
Dept: OPHTHALMOLOGY | Facility: CLINIC | Age: 73
End: 2023-08-25
Payer: MEDICAID

## 2023-08-25 DIAGNOSIS — H18.49 CORNEAL DELLEN OF RIGHT EYE: Primary | ICD-10-CM

## 2023-08-25 PROCEDURE — 1159F MED LIST DOCD IN RCRD: CPT | Mod: CPTII,,, | Performed by: OPTOMETRIST

## 2023-08-25 PROCEDURE — 1160F RVW MEDS BY RX/DR IN RCRD: CPT | Mod: CPTII,,, | Performed by: OPTOMETRIST

## 2023-08-25 PROCEDURE — 1160F PR REVIEW ALL MEDS BY PRESCRIBER/CLIN PHARMACIST DOCUMENTED: ICD-10-PCS | Mod: CPTII,,, | Performed by: OPTOMETRIST

## 2023-08-25 PROCEDURE — 99213 OFFICE O/P EST LOW 20 MIN: CPT | Mod: PBBFAC | Performed by: OPTOMETRIST

## 2023-08-25 PROCEDURE — 99213 PR OFFICE/OUTPT VISIT, EST, LEVL III, 20-29 MIN: ICD-10-PCS | Mod: S$PBB,,, | Performed by: OPTOMETRIST

## 2023-08-25 PROCEDURE — 99999 PR PBB SHADOW E&M-EST. PATIENT-LVL III: CPT | Mod: PBBFAC,,, | Performed by: OPTOMETRIST

## 2023-08-25 PROCEDURE — 1159F PR MEDICATION LIST DOCUMENTED IN MEDICAL RECORD: ICD-10-PCS | Mod: CPTII,,, | Performed by: OPTOMETRIST

## 2023-08-25 PROCEDURE — 99999 PR PBB SHADOW E&M-EST. PATIENT-LVL III: ICD-10-PCS | Mod: PBBFAC,,, | Performed by: OPTOMETRIST

## 2023-08-25 PROCEDURE — 99213 OFFICE O/P EST LOW 20 MIN: CPT | Mod: S$PBB,,, | Performed by: OPTOMETRIST

## 2023-08-25 NOTE — PROGRESS NOTES
HPI     Follow-up            Comments: Start Vigamox q2hr while awake and Erythromycin q4hr while   awake   Consider adding steroid at f/u   pt has been compliant with ointments  RTC 2 days for follow up  Pt states VA is better and no pain         Last edited by Nazia Delgado on 8/25/2023  3:25 PM.            Assessment /Plan     For exam results, see Encounter Report.    Corneal dellen of right eye    90% improvement today OD  Continue emycin edwin q 4 hrs w/a through the weekend  If symptoms have improved/resolved on Monday, reduce emycin edwin to qhs OD  PT has not been using Vigamox  Monitor 1 week      RTC 1 week for follow up or PRN with any worsening  Discussed above and all questions were answered.

## 2024-05-13 ENCOUNTER — PATIENT MESSAGE (OUTPATIENT)
Dept: OPHTHALMOLOGY | Facility: CLINIC | Age: 74
End: 2024-05-13

## 2024-05-13 ENCOUNTER — OFFICE VISIT (OUTPATIENT)
Dept: OPHTHALMOLOGY | Facility: CLINIC | Age: 74
End: 2024-05-13
Payer: MEDICAID

## 2024-05-13 DIAGNOSIS — H01.02B SQUAMOUS BLEPHARITIS OF UPPER AND LOWER EYELIDS OF BOTH EYES: Primary | ICD-10-CM

## 2024-05-13 DIAGNOSIS — H25.13 CATARACT, NUCLEAR SCLEROTIC SENILE, BILATERAL: ICD-10-CM

## 2024-05-13 DIAGNOSIS — H04.123 DRY EYES, BILATERAL: ICD-10-CM

## 2024-05-13 DIAGNOSIS — H01.02A SQUAMOUS BLEPHARITIS OF UPPER AND LOWER EYELIDS OF BOTH EYES: Primary | ICD-10-CM

## 2024-05-13 PROCEDURE — 92015 DETERMINE REFRACTIVE STATE: CPT | Mod: ,,, | Performed by: OPTOMETRIST

## 2024-05-13 PROCEDURE — 92014 COMPRE OPH EXAM EST PT 1/>: CPT | Mod: S$PBB,,, | Performed by: OPTOMETRIST

## 2024-05-13 PROCEDURE — 99213 OFFICE O/P EST LOW 20 MIN: CPT | Mod: PBBFAC | Performed by: OPTOMETRIST

## 2024-05-13 PROCEDURE — 1159F MED LIST DOCD IN RCRD: CPT | Mod: CPTII,,, | Performed by: OPTOMETRIST

## 2024-05-13 PROCEDURE — 99999 PR PBB SHADOW E&M-EST. PATIENT-LVL III: CPT | Mod: PBBFAC,,, | Performed by: OPTOMETRIST

## 2024-05-13 RX ORDER — NEOMYCIN SULFATE, POLYMYXIN B SULFATE AND DEXAMETHASONE 3.5; 10000; 1 MG/ML; [USP'U]/ML; MG/ML
1 SUSPENSION/ DROPS OPHTHALMIC 4 TIMES DAILY
Qty: 10 ML | Refills: 0 | Status: SHIPPED | OUTPATIENT
Start: 2024-05-13 | End: 2024-05-23

## 2024-05-13 NOTE — PROGRESS NOTES
SUBJECTIVE  Surrirao Hughes is 73 y.o. female  Corrected distance visual acuity was 20/80 +1 in the right eye and 20/80 +1 in the left eye. Corrected near visual acuity was J5 in the right eye and J5 in the left eye.   Chief Complaint   Patient presents with    Hypertensive Eye Exam     Pt here for annual HTN eye exam. Pt states her BS has been fine. Pt is here with her son today. Pt thinks that she needs a new rx for glasses. Pt denies flashes and floaters.     Dry Eye     Pt uses OTC tears and fish oil and her eyes are still really dry.           HPI     Hypertensive Eye Exam     Additional comments: Pt here for annual HTN eye exam. Pt states her BS   has been fine. Pt is here with her son today. Pt thinks that she needs a   new rx for glasses. Pt denies flashes and floaters.            Dry Eye     Additional comments: Pt uses OTC tears and fish oil and her eyes are   still really dry.            Comments    Cataract, nuclear sclerotic senile, bilateral   Dry eyes, bilateral      OTC Tears and Tear Gel  Evoxac PO            Last edited by Edwin Spencer, OD on 5/13/2024  8:48 AM.         Assessment /Plan :  1. Squamous blepharitis of upper and lower eyelids of both eyes   Start Maxitrol qid OU for discharge and dry eye symptoms.     2. Dry eyes, bilateral   Will continue:  OTC Tears and Tear Gel  Evoxac PO  Will try Restasis bid OU if Maxitrol doesn't control symptoms, will call in 2 weeks in no improvement     3. Cataract, nuclear sclerotic senile, bilateral   Moderate cataracts OU. Cataracts are not significantly affecting activities of daily living and therefore surgery is not indicated at this time.   Will continue to monitor annually. Pt to call or RTC with any significant change in vision prior to next visit.   Worsening, will reevaluate after keratitis clears

## 2024-05-13 NOTE — PATIENT INSTRUCTIONS
Continue:  OTC Tears and Tear Gel  Evoxac PO  Start Maxitrol qid OU x 10 days    Will try Restasis bid OU if Maxitrol doesn't control symptoms, will call in 2 weeks in no improvement

## 2024-06-03 ENCOUNTER — TELEPHONE (OUTPATIENT)
Dept: OPHTHALMOLOGY | Facility: CLINIC | Age: 74
End: 2024-06-03
Payer: MEDICAID

## 2024-06-03 DIAGNOSIS — M35.01 KERATITIS SICCA: Primary | ICD-10-CM

## 2024-06-03 RX ORDER — CYCLOSPORINE 0.5 MG/ML
1 EMULSION OPHTHALMIC 2 TIMES DAILY
Qty: 60 EACH | Refills: 11 | Status: SHIPPED | OUTPATIENT
Start: 2024-06-03 | End: 2025-06-03

## 2024-06-03 NOTE — TELEPHONE ENCOUNTER
Called patient son( sujit) spoke with him he said the maxitrol did not work, advised him to bring her in if her eyes are not doing better,    SRS  .    ----- Message from Jalyn Martínez sent at 6/3/2024 10:36 AM CDT -----  Contact: Sujit/son  Sujit is calling regards to no improvement and wants to know if the office can go ahead and called something else in for her eyes, Please call him at [phone].    [id number] - [place] - [address] AT [facility]  [address]  [place] [id number]  Phone: [phone] Fax: [phone]    Thanks  Td

## 2024-08-13 DIAGNOSIS — H04.123 DRY EYES, BILATERAL: ICD-10-CM

## 2024-08-13 RX ORDER — CEVIMELINE HYDROCHLORIDE 30 MG/1
CAPSULE ORAL
Qty: 90 CAPSULE | Refills: 11 | Status: SHIPPED | OUTPATIENT
Start: 2024-08-13

## (undated) DEVICE — NDL 18GA X1 1/2 REG BEVEL

## (undated) DEVICE — DRAPE PLASTIC U 60X72

## (undated) DEVICE — GLOVE 8 PROTEXIS PI ORTHO

## (undated) DEVICE — COVER BACK TABLE 72X21

## (undated) DEVICE — SUT 2/0 36IN COATED VICRYL

## (undated) DEVICE — COVER OVERHEAD SURG LT BLUE

## (undated) DEVICE — DRESSING AQUACEL AG ADV 3.5X12

## (undated) DEVICE — BLADE 90X13X1.27MM

## (undated) DEVICE — GLOVE PROTEXIS HYDROGEL SZ8.5

## (undated) DEVICE — SEE MEDLINE ITEM 157172

## (undated) DEVICE — DRESSING AQUACEL AG 3.5X10IN

## (undated) DEVICE — Device

## (undated) DEVICE — GLOVE 6.5 PROTEXIS PI BLUE

## (undated) DEVICE — SEE MEDLINE ITEM 157116

## (undated) DEVICE — MANIFOLD 4 PORT

## (undated) DEVICE — PULSAVAC ZIMMER

## (undated) DEVICE — STAPLER SKIN ROTATING HEAD

## (undated) DEVICE — SEE MEDLINE ITEM 152523

## (undated) DEVICE — DRESSING AQUACEL SACRAL 9 X 9

## (undated) DEVICE — SUT CTD VICRYL CT-1 27

## (undated) DEVICE — ELECTRODE REM PLYHSV RETURN 9

## (undated) DEVICE — STOCKINET TUBULAR 1 PLY 6X60IN

## (undated) DEVICE — DRAPE INCISE IOBAN 2 23X23IN

## (undated) DEVICE — DRAPE STERI U-SHAPED 47X51IN

## (undated) DEVICE — PAD PREP 50/CA

## (undated) DEVICE — SPONGE LAP 18X18 PREWASHED

## (undated) DEVICE — HOOD T-5 TEAR AWAY STERILE

## (undated) DEVICE — BLADE SURG #15 CARBON STEEL

## (undated) DEVICE — BOWL MIXING BONE CEMENT

## (undated) DEVICE — APPLICATOR CHLORAPREP ORN 26ML

## (undated) DEVICE — GLOVE PROTEXIS HYDROGEL SZ6

## (undated) DEVICE — DRESSING COVER AQUACEL AG SURG

## (undated) DEVICE — SEE MEDLINE ITEM 157131

## (undated) DEVICE — SEE MEDLINE ITEM 146231

## (undated) DEVICE — SEE MEDLINE ITEM 152622

## (undated) DEVICE — SEE MEDLINE ITEM 152530